# Patient Record
Sex: FEMALE | Race: WHITE | NOT HISPANIC OR LATINO
[De-identification: names, ages, dates, MRNs, and addresses within clinical notes are randomized per-mention and may not be internally consistent; named-entity substitution may affect disease eponyms.]

---

## 2019-03-22 ENCOUNTER — APPOINTMENT (OUTPATIENT)
Dept: OPHTHALMOLOGY | Facility: CLINIC | Age: 39
End: 2019-03-22

## 2019-03-22 PROBLEM — Z00.00 ENCOUNTER FOR PREVENTIVE HEALTH EXAMINATION: Status: ACTIVE | Noted: 2019-03-22

## 2019-04-02 ENCOUNTER — APPOINTMENT (OUTPATIENT)
Dept: OPHTHALMOLOGY | Facility: CLINIC | Age: 39
End: 2019-04-02
Payer: COMMERCIAL

## 2019-04-02 DIAGNOSIS — H04.123 DRY EYE SYNDROME OF BILATERAL LACRIMAL GLANDS: ICD-10-CM

## 2019-04-02 PROCEDURE — 92002 INTRM OPH EXAM NEW PATIENT: CPT

## 2021-04-08 ENCOUNTER — RESULT REVIEW (OUTPATIENT)
Age: 41
End: 2021-04-08

## 2021-05-12 ENCOUNTER — APPOINTMENT (OUTPATIENT)
Dept: HUMAN REPRODUCTION | Facility: CLINIC | Age: 41
End: 2021-05-12
Payer: COMMERCIAL

## 2021-05-12 PROCEDURE — 99204 OFFICE O/P NEW MOD 45 MIN: CPT | Mod: 95

## 2021-05-18 ENCOUNTER — TRANSCRIPTION ENCOUNTER (OUTPATIENT)
Age: 41
End: 2021-05-18

## 2021-06-21 ENCOUNTER — APPOINTMENT (OUTPATIENT)
Dept: HUMAN REPRODUCTION | Facility: CLINIC | Age: 41
End: 2021-06-21
Payer: COMMERCIAL

## 2021-06-21 PROCEDURE — 76831 ECHO EXAM UTERUS: CPT

## 2021-06-21 PROCEDURE — 99214 OFFICE O/P EST MOD 30 MIN: CPT | Mod: 25

## 2021-06-21 PROCEDURE — 99072 ADDL SUPL MATRL&STAF TM PHE: CPT

## 2021-06-21 PROCEDURE — 58340 CATHETER FOR HYSTEROGRAPHY: CPT

## 2021-09-07 ENCOUNTER — APPOINTMENT (OUTPATIENT)
Dept: HUMAN REPRODUCTION | Facility: CLINIC | Age: 41
End: 2021-09-07
Payer: SELF-PAY

## 2021-09-07 PROCEDURE — 99213 OFFICE O/P EST LOW 20 MIN: CPT | Mod: 25

## 2021-09-07 PROCEDURE — 36415 COLL VENOUS BLD VENIPUNCTURE: CPT

## 2021-09-07 PROCEDURE — 76830 TRANSVAGINAL US NON-OB: CPT

## 2021-09-07 PROCEDURE — 99072 ADDL SUPL MATRL&STAF TM PHE: CPT

## 2021-09-08 ENCOUNTER — APPOINTMENT (OUTPATIENT)
Dept: HUMAN REPRODUCTION | Facility: CLINIC | Age: 41
End: 2021-09-08
Payer: SELF-PAY

## 2021-09-08 PROCEDURE — 58322 ARTIFICIAL INSEMINATION: CPT

## 2021-09-08 PROCEDURE — 89260 SPERM ISOLATION SIMPLE: CPT

## 2021-09-22 ENCOUNTER — APPOINTMENT (OUTPATIENT)
Dept: HUMAN REPRODUCTION | Facility: CLINIC | Age: 41
End: 2021-09-22

## 2021-11-01 ENCOUNTER — APPOINTMENT (OUTPATIENT)
Dept: HUMAN REPRODUCTION | Facility: CLINIC | Age: 41
End: 2021-11-01
Payer: SELF-PAY

## 2021-11-01 PROCEDURE — 99213 OFFICE O/P EST LOW 20 MIN: CPT | Mod: 25

## 2021-11-01 PROCEDURE — 76830 TRANSVAGINAL US NON-OB: CPT

## 2021-11-01 PROCEDURE — 36415 COLL VENOUS BLD VENIPUNCTURE: CPT

## 2021-11-01 PROCEDURE — 82670 ASSAY OF TOTAL ESTRADIOL: CPT

## 2021-11-01 PROCEDURE — 83002 ASSAY OF GONADOTROPIN (LH): CPT | Mod: QW

## 2021-11-02 ENCOUNTER — APPOINTMENT (OUTPATIENT)
Dept: HUMAN REPRODUCTION | Facility: CLINIC | Age: 41
End: 2021-11-02
Payer: SELF-PAY

## 2021-11-02 PROCEDURE — 89260 SPERM ISOLATION SIMPLE: CPT

## 2021-11-02 PROCEDURE — 58322 ARTIFICIAL INSEMINATION: CPT

## 2021-11-02 PROCEDURE — 99212 OFFICE O/P EST SF 10 MIN: CPT | Mod: 25

## 2024-09-13 ENCOUNTER — OUTPATIENT (OUTPATIENT)
Dept: OUTPATIENT SERVICES | Facility: HOSPITAL | Age: 44
LOS: 1 days | End: 2024-09-13
Payer: SELF-PAY

## 2024-09-13 VITALS
OXYGEN SATURATION: 100 % | RESPIRATION RATE: 18 BRPM | HEART RATE: 73 BPM | TEMPERATURE: 98 F | DIASTOLIC BLOOD PRESSURE: 84 MMHG | SYSTOLIC BLOOD PRESSURE: 130 MMHG

## 2024-09-13 DIAGNOSIS — Z87.42 PERSONAL HISTORY OF OTHER DISEASES OF THE FEMALE GENITAL TRACT: Chronic | ICD-10-CM

## 2024-09-13 DIAGNOSIS — O26.899 OTHER SPECIFIED PREGNANCY RELATED CONDITIONS, UNSPECIFIED TRIMESTER: ICD-10-CM

## 2024-09-13 DIAGNOSIS — Z98.890 OTHER SPECIFIED POSTPROCEDURAL STATES: Chronic | ICD-10-CM

## 2024-09-13 LAB
ALBUMIN SERPL ELPH-MCNC: 3.2 G/DL — LOW (ref 3.3–5)
ALP SERPL-CCNC: 116 U/L — SIGNIFICANT CHANGE UP (ref 40–120)
ALT FLD-CCNC: 13 U/L — SIGNIFICANT CHANGE UP (ref 10–45)
ANION GAP SERPL CALC-SCNC: 14 MMOL/L — SIGNIFICANT CHANGE UP (ref 5–17)
APPEARANCE UR: CLEAR — SIGNIFICANT CHANGE UP
AST SERPL-CCNC: 17 U/L — SIGNIFICANT CHANGE UP (ref 10–40)
BASOPHILS # BLD AUTO: 0.01 K/UL — SIGNIFICANT CHANGE UP (ref 0–0.2)
BASOPHILS NFR BLD AUTO: 0.2 % — SIGNIFICANT CHANGE UP (ref 0–2)
BILIRUB SERPL-MCNC: 0.2 MG/DL — SIGNIFICANT CHANGE UP (ref 0.2–1.2)
BILIRUB UR-MCNC: NEGATIVE — SIGNIFICANT CHANGE UP
BUN SERPL-MCNC: 7 MG/DL — SIGNIFICANT CHANGE UP (ref 7–23)
CALCIUM SERPL-MCNC: 9.2 MG/DL — SIGNIFICANT CHANGE UP (ref 8.4–10.5)
CHLORIDE SERPL-SCNC: 104 MMOL/L — SIGNIFICANT CHANGE UP (ref 96–108)
CO2 SERPL-SCNC: 19 MMOL/L — LOW (ref 22–31)
COLOR SPEC: YELLOW — SIGNIFICANT CHANGE UP
CREAT ?TM UR-MCNC: 18 MG/DL — SIGNIFICANT CHANGE UP
CREAT SERPL-MCNC: 0.47 MG/DL — LOW (ref 0.5–1.3)
DIFF PNL FLD: NEGATIVE — SIGNIFICANT CHANGE UP
EGFR: 123 ML/MIN/1.73M2 — SIGNIFICANT CHANGE UP
EOSINOPHIL # BLD AUTO: 0.11 K/UL — SIGNIFICANT CHANGE UP (ref 0–0.5)
EOSINOPHIL NFR BLD AUTO: 1.7 % — SIGNIFICANT CHANGE UP (ref 0–6)
GLUCOSE SERPL-MCNC: 75 MG/DL — SIGNIFICANT CHANGE UP (ref 70–99)
GLUCOSE UR QL: NEGATIVE MG/DL — SIGNIFICANT CHANGE UP
HCT VFR BLD CALC: 32.4 % — LOW (ref 34.5–45)
HGB BLD-MCNC: 11.1 G/DL — LOW (ref 11.5–15.5)
IMM GRANULOCYTES NFR BLD AUTO: 0.3 % — SIGNIFICANT CHANGE UP (ref 0–0.9)
KETONES UR-MCNC: NEGATIVE MG/DL — SIGNIFICANT CHANGE UP
LDH SERPL L TO P-CCNC: 268 U/L — HIGH (ref 50–242)
LEUKOCYTE ESTERASE UR-ACNC: NEGATIVE — SIGNIFICANT CHANGE UP
LYMPHOCYTES # BLD AUTO: 1.08 K/UL — SIGNIFICANT CHANGE UP (ref 1–3.3)
LYMPHOCYTES # BLD AUTO: 16.5 % — SIGNIFICANT CHANGE UP (ref 13–44)
MCHC RBC-ENTMCNC: 31.4 PG — SIGNIFICANT CHANGE UP (ref 27–34)
MCHC RBC-ENTMCNC: 34.3 GM/DL — SIGNIFICANT CHANGE UP (ref 32–36)
MCV RBC AUTO: 91.5 FL — SIGNIFICANT CHANGE UP (ref 80–100)
MONOCYTES # BLD AUTO: 0.4 K/UL — SIGNIFICANT CHANGE UP (ref 0–0.9)
MONOCYTES NFR BLD AUTO: 6.1 % — SIGNIFICANT CHANGE UP (ref 2–14)
NEUTROPHILS # BLD AUTO: 4.92 K/UL — SIGNIFICANT CHANGE UP (ref 1.8–7.4)
NEUTROPHILS NFR BLD AUTO: 75.2 % — SIGNIFICANT CHANGE UP (ref 43–77)
NITRITE UR-MCNC: NEGATIVE — SIGNIFICANT CHANGE UP
NRBC # BLD: 0 /100 WBCS — SIGNIFICANT CHANGE UP (ref 0–0)
PH UR: 7 — SIGNIFICANT CHANGE UP (ref 5–8)
PLATELET # BLD AUTO: 230 K/UL — SIGNIFICANT CHANGE UP (ref 150–400)
POTASSIUM SERPL-MCNC: 4.7 MMOL/L — SIGNIFICANT CHANGE UP (ref 3.5–5.3)
POTASSIUM SERPL-SCNC: 4.7 MMOL/L — SIGNIFICANT CHANGE UP (ref 3.5–5.3)
PROT ?TM UR-MCNC: 8 MG/DL — SIGNIFICANT CHANGE UP (ref 0–12)
PROT SERPL-MCNC: 6.1 G/DL — SIGNIFICANT CHANGE UP (ref 6–8.3)
PROT UR-MCNC: NEGATIVE MG/DL — SIGNIFICANT CHANGE UP
PROT/CREAT UR-RTO: 0.4 RATIO — HIGH (ref 0–0.2)
RBC # BLD: 3.54 M/UL — LOW (ref 3.8–5.2)
RBC # FLD: 12.8 % — SIGNIFICANT CHANGE UP (ref 10.3–14.5)
SODIUM SERPL-SCNC: 137 MMOL/L — SIGNIFICANT CHANGE UP (ref 135–145)
SP GR SPEC: <1.005 — LOW (ref 1–1.03)
URATE SERPL-MCNC: 4.9 MG/DL — SIGNIFICANT CHANGE UP (ref 2.5–7)
UROBILINOGEN FLD QL: 0.2 MG/DL — SIGNIFICANT CHANGE UP (ref 0.2–1)
WBC # BLD: 6.54 K/UL — SIGNIFICANT CHANGE UP (ref 3.8–10.5)
WBC # FLD AUTO: 6.54 K/UL — SIGNIFICANT CHANGE UP (ref 3.8–10.5)

## 2024-09-13 PROCEDURE — 84156 ASSAY OF PROTEIN URINE: CPT

## 2024-09-13 PROCEDURE — G0463: CPT

## 2024-09-13 PROCEDURE — 59025 FETAL NON-STRESS TEST: CPT

## 2024-09-13 PROCEDURE — 84550 ASSAY OF BLOOD/URIC ACID: CPT

## 2024-09-13 PROCEDURE — 82570 ASSAY OF URINE CREATININE: CPT

## 2024-09-13 PROCEDURE — 36415 COLL VENOUS BLD VENIPUNCTURE: CPT

## 2024-09-13 PROCEDURE — 81003 URINALYSIS AUTO W/O SCOPE: CPT

## 2024-09-13 PROCEDURE — 85025 COMPLETE CBC W/AUTO DIFF WBC: CPT

## 2024-09-13 PROCEDURE — 83615 LACTATE (LD) (LDH) ENZYME: CPT

## 2024-09-13 PROCEDURE — 80053 COMPREHEN METABOLIC PANEL: CPT

## 2024-09-13 NOTE — OB PROVIDER TRIAGE NOTE - HISTORY OF PRESENT ILLNESS
HPI: Pt is a 44yo  presenting for r/o PEC.  FM: endorsed  LOF: denies  CTX: denies  VB: denies    PNC uncomplicated.    OBHx: primigravida  GynHx: endometriosis; denies hx of fibroids, cysts, abnormal Pap smears, polyps, or STDs  PMHx: denies  PSHx: s/p laparoscopy for endo ; s/p hernia repair  Med: PNV, bASA  All: NKDA  Psych: denies hx of depression or anxiety  SH: denies hx of smoking, drinking, or drug usage during the pregnancy

## 2024-09-13 NOTE — OB RN TRIAGE NOTE - FALL HARM RISK - FALLEN IN PAST
Level of Care: Telemetry [5]   Diagnosis: Diarrhea [787.91.ICD-9-CM]   Admitting Physician: SCOOTER QUIÑONEZ III [[id number]]   Attending Physician: SCOOTER QUIÑONEZ III [[id number]]   Bed Request Comments: tele obs   No

## 2024-09-13 NOTE — OB PROVIDER TRIAGE NOTE - NSOBPROVIDERNOTE_OBGYN_ALL_OB_FT
INCOMPLETE    A/P: Pt is a 43y  who presents for r/o PEC.     1. Admit to LND. Routine Labs. IVF  2. Expectant Management vs. IOL  3. Fetus: Cat X tracing, Vertex, EFW _ . C/w EFM.  4+ List all prenatal issues w/ a plan (PEC, GDM, TOLAC, Asthma, etc)  5. GBS status + plan  6. Pain: IV pain meds/epidural PRN    John Hart PGY1  Discussed with Dr. Joseph A/P: Pt is a 43y  who presents for r/o PEC. Pt had elevated BPs in the office.     - cycle BPs q15 min   - f/u HELLP labs and P/C  - no severe symptoms at this time  - fetal status reassuring     John Hart PGY1  Heather Holman, PGY4  d/w Dr. Joseph   Discussed with Dr. Joseph A/P: Pt is a 43y  who presents for r/o PEC. Pt had elevated BPs in the office.     - cycle BPs q15 min   - f/u HELLP labs and P/C  - no severe symptoms at this time  - fetal status reassuring     John Hart PGY1  Heather Holman, PGY4  d/w Dr. Joseph   Discussed with Dr. Joseph    Addendum:  HELLP labs wnl, P/C 0.4  Pt meets criteria for PEC w/o severe features  Counseled on PEC, concerning symptoms including HA/vision changes/SOB/abdominal pain   BID BP check @home  24h urine collection, urine jug given to pt to complete collection @home  Discharge to home   Return precautions reviewed  Fetal status reassuring on NST    Dr. Joseph at bedside  John Hart PGY1

## 2024-09-13 NOTE — OB RN TRIAGE NOTE - NSNURSINGINSTR_OBGYN_ALL_OB_FT
patient given written and verbal PTL discharge instructions; patient educated on 24hr urine collection and given jug to collect at home; patient made aware to follow-up with MD office; patient  verbalized understanding of all instructions

## 2024-09-13 NOTE — OB PROVIDER TRIAGE NOTE - NSHPPHYSICALEXAM_GEN_ALL_CORE
Vital Signs Last 24 Hrs  T(C): 36.8 (13 Sep 2024 10:27), Max: 36.8 (13 Sep 2024 10:14)  T(F): 98.24 (13 Sep 2024 10:27), Max: 98.24 (13 Sep 2024 10:27)  HR: 77 (13 Sep 2024 10:58) (73 - 86)  BP: 147/97 (13 Sep 2024 10:57) (130/84 - 147/97)  BP(mean): --  RR: 18 (13 Sep 2024 10:27) (18 - 18)  SpO2: 100% (13 Sep 2024 10:58) (99% - 100%)    Parameters below as of 13 Sep 2024 10:14  Patient On (Oxygen Delivery Method): room air        Physical Exam:  Gen: NAD, AOx3  CV: RRR  Resp: CTAB  Abd: soft, NT, gravid    Speculum Exam:   - pooling, nitrazine, ferning, bleeding   - (lesions if pt has history of HSV)    SVE:   FHT:  [place]:  EFW: Sono/leopolds  Sono: fetal presentation, placentation  BPP: Vital Signs Last 24 Hrs  T(C): 36.8 (13 Sep 2024 10:27), Max: 36.8 (13 Sep 2024 10:14)  T(F): 98.24 (13 Sep 2024 10:27), Max: 98.24 (13 Sep 2024 10:27)  HR: 77 (13 Sep 2024 10:58) (73 - 86)  BP: 147/97 (13 Sep 2024 10:57) (130/84 - 147/97)  BP(mean): --  RR: 18 (13 Sep 2024 10:27) (18 - 18)  SpO2: 100% (13 Sep 2024 10:58) (99% - 100%)    Parameters below as of 13 Sep 2024 10:14  Patient On (Oxygen Delivery Method): room air        Physical Exam:  Gen: NAD, AOx3  CV: extremies  Resp: no increased WOB  Abd: gravid    Speculum Exam deferred    SVE deferred  FHT: baseline 125, mod nikunj, +accels, - decels  [place]: uterine irritability

## 2024-09-15 ENCOUNTER — OUTPATIENT (OUTPATIENT)
Dept: OUTPATIENT SERVICES | Facility: HOSPITAL | Age: 44
LOS: 1 days | End: 2024-09-15
Payer: SELF-PAY

## 2024-09-15 VITALS
HEART RATE: 77 BPM | SYSTOLIC BLOOD PRESSURE: 141 MMHG | TEMPERATURE: 98 F | DIASTOLIC BLOOD PRESSURE: 86 MMHG | RESPIRATION RATE: 16 BRPM | OXYGEN SATURATION: 97 %

## 2024-09-15 DIAGNOSIS — Z87.42 PERSONAL HISTORY OF OTHER DISEASES OF THE FEMALE GENITAL TRACT: Chronic | ICD-10-CM

## 2024-09-15 DIAGNOSIS — Z98.890 OTHER SPECIFIED POSTPROCEDURAL STATES: Chronic | ICD-10-CM

## 2024-09-15 DIAGNOSIS — O26.899 OTHER SPECIFIED PREGNANCY RELATED CONDITIONS, UNSPECIFIED TRIMESTER: ICD-10-CM

## 2024-09-15 LAB
APPEARANCE UR: CLEAR — SIGNIFICANT CHANGE UP
BILIRUB UR-MCNC: NEGATIVE — SIGNIFICANT CHANGE UP
COLOR SPEC: YELLOW — SIGNIFICANT CHANGE UP
DIFF PNL FLD: NEGATIVE — SIGNIFICANT CHANGE UP
GLUCOSE UR QL: NEGATIVE MG/DL — SIGNIFICANT CHANGE UP
KETONES UR-MCNC: NEGATIVE MG/DL — SIGNIFICANT CHANGE UP
LEUKOCYTE ESTERASE UR-ACNC: ABNORMAL
NITRITE UR-MCNC: NEGATIVE — SIGNIFICANT CHANGE UP
PH UR: 7.5 — SIGNIFICANT CHANGE UP (ref 5–8)
PROT UR-MCNC: NEGATIVE MG/DL — SIGNIFICANT CHANGE UP
SP GR SPEC: 1.01 — SIGNIFICANT CHANGE UP (ref 1–1.03)
UROBILINOGEN FLD QL: 0.2 MG/DL — SIGNIFICANT CHANGE UP (ref 0.2–1)

## 2024-09-15 NOTE — OB PROVIDER TRIAGE NOTE - ATTENDING COMMENTS
Pt seen 845 am 9/16 after being monitored o/n.  BP's have been stable, no severe range BPs, labs unchanged.  Rec course of BMZ for FLM as poss need for PTD sec to gHTN if becomes severe, pros/cons reviewed.  Pt agreed.  Will then dc home, cont to monitor BPs, f/u in office tomorrow for 2nd dose.  DOUGLAS Joseph

## 2024-09-15 NOTE — OB RN TRIAGE NOTE - FALL HARM RISK - UNIVERSAL INTERVENTIONS
Bed in lowest position, wheels locked, appropriate side rails in place/Call bell, personal items and telephone in reach/Instruct patient to call for assistance before getting out of bed or chair/Non-slip footwear when patient is out of bed/[place] to call system/Physically safe environment - no spills, clutter or unnecessary equipment/Purposeful Proactive Rounding/Room/bathroom lighting operational, light cord in reach

## 2024-09-15 NOTE — OB PROVIDER TRIAGE NOTE - NSOBPROVIDERNOTE_OBGYN_ALL_OB_FT
A/P: Pt is a 43y  @ 33w3d who presents with elevated BPs at home. Pt with known PEC.    - cycle BPs q15 min   - f/u HELLP labs   - no severe symptoms at this time  - fetal status reassuring     Molly Castro PGY2 [Fully active, able to carry on all pre-disease performance without restriction] : Status 0 - Fully active, able to carry on all pre-disease performance without restriction [Normal] : affect appropriate

## 2024-09-15 NOTE — OB PROVIDER TRIAGE NOTE - HISTORY OF PRESENT ILLNESS
HPI: Pt is a 42yo  @ 33w3d presents with elevated BPs at home. Pt with known PEC. Pt reports elevated BPs 150-160s systolic at home.   Denies HA, vision changes, RUQ pain, chest pain, SOB, N/V.    +FM. -LOF,-CTX,-VB.     PNC uncomplicated.    OBHx: primigravida  GynHx: endometriosis; denies hx of fibroids, cysts, abnormal Pap smears, polyps, or STDs  PMHx: denies  PSHx: s/p laparoscopy for endo ; s/p hernia repair  Med: PNV, bASA  All: NKDA  Psych: denies hx of depression or anxiety  SH: denies hx of smoking, drinking, or drug usage during the pregnancy       HPI: Pt is a 44yo  @ 33w3d presents with elevated BPs at home. Pt with known PEC. Pt reports elevated BPs 150-160s systolic at home.   Denies HA, vision changes, RUQ pain, chest pain, SOB, N/V.    +FM. -LOF,-CTX,-VB.     PNC: diagnosed with PEC w/o SF on     OBHx: primigravida  GynHx: endometriosis; denies hx of fibroids, cysts, abnormal Pap smears, polyps, or STDs  PMHx: denies  PSHx: s/p laparoscopy for endo ; s/p hernia repair  Med: PNV, bASA  All: NKDA  Psych: denies hx of depression or anxiety  SH: denies hx of smoking, drinking, or drug usage during the pregnancy

## 2024-09-15 NOTE — OB PROVIDER TRIAGE NOTE - NSRISKFACTORSDETA_OBGYN_ALL_OB
----- Message from Merlene Feliciano MD sent at 7/6/2022 10:09 PM CDT -----  Bruna/Cardiology nurse - please notify patient of stable Holter, but recommend f/u Holter in 1 year (48hour) to re-assess burden of ectopy - please do 1 week prior to Cardiology f/u visit.  Thanks - Merlene Feliciano
Holter monitor 6/29/22:  1. Â Predominant rhythm is sinus rhythm and sinus bradycardia. 2. Â Rare supraventricular ectopy with total burden 0.36% without sustained symptomatic atrial arrhythmia or supraventricular arrhythmia or definite evidence of atrial fibrillation. 3. Â Occasional premature ventricular contractions with intermediate burden overall of 9.49%. Â PVCs do not appear to correlate with any particular symptoms. 4. Â During report of symptoms, as described above, the patient did not appear to have evidence of any arrhythmia.
Left detailed message, per snapshot, with results and recommendations. Monitor order placed and callback number provided for patient to call and schedule.
Gestational Hypertension

## 2024-09-15 NOTE — OB PROVIDER TRIAGE NOTE - NSHPPHYSICALEXAM_GEN_ALL_CORE
Vitals:   AF  BP:  HR:    Physical Exam:  Gen: NAD, AOx3  CV: extremities  Resp: no increased WOB  Abd: gravid    Speculum Exam deferred    SVE deferred  FHT: baseline _, mod nikunj, +accels, - decels  [place]: uterine irritability Vitals:   AF  BP: 141/86  HR: 77    Physical Exam:  Gen: NAD, AOx3  CV: extremities  Resp: no increased WOB  Abd: gravid    Speculum Exam deferred    SVE deferred  FHT: baseline 135bpm, mod nikunj, +accels, - decels  [place]: uterine irritability

## 2024-09-16 VITALS — HEART RATE: 84 BPM | OXYGEN SATURATION: 94 %

## 2024-09-16 VITALS — HEART RATE: 79 BPM | OXYGEN SATURATION: 98 %

## 2024-09-16 DIAGNOSIS — N80.9 ENDOMETRIOSIS, UNSPECIFIED: ICD-10-CM

## 2024-09-16 DIAGNOSIS — Z3A.33 33 WEEKS GESTATION OF PREGNANCY: ICD-10-CM

## 2024-09-16 DIAGNOSIS — O09.813 SUPERVISION OF PREGNANCY RESULTING FROM ASSISTED REPRODUCTIVE TECHNOLOGY, THIRD TRIMESTER: ICD-10-CM

## 2024-09-16 DIAGNOSIS — O09.513 SUPERVISION OF ELDERLY PRIMIGRAVIDA, THIRD TRIMESTER: ICD-10-CM

## 2024-09-16 DIAGNOSIS — O99.891 OTHER SPECIFIED DISEASES AND CONDITIONS COMPLICATING PREGNANCY: ICD-10-CM

## 2024-09-16 DIAGNOSIS — O14.93 UNSPECIFIED PRE-ECLAMPSIA, THIRD TRIMESTER: ICD-10-CM

## 2024-09-16 LAB
ALBUMIN SERPL ELPH-MCNC: 3.2 G/DL — LOW (ref 3.3–5)
ALP SERPL-CCNC: 119 U/L — SIGNIFICANT CHANGE UP (ref 40–120)
ALT FLD-CCNC: 16 U/L — SIGNIFICANT CHANGE UP (ref 10–45)
ANION GAP SERPL CALC-SCNC: 12 MMOL/L — SIGNIFICANT CHANGE UP (ref 5–17)
AST SERPL-CCNC: 18 U/L — SIGNIFICANT CHANGE UP (ref 10–40)
BACTERIA # UR AUTO: NEGATIVE /HPF — SIGNIFICANT CHANGE UP
BASOPHILS # BLD AUTO: 0.02 K/UL — SIGNIFICANT CHANGE UP (ref 0–0.2)
BASOPHILS NFR BLD AUTO: 0.3 % — SIGNIFICANT CHANGE UP (ref 0–2)
BILIRUB SERPL-MCNC: 0.1 MG/DL — LOW (ref 0.2–1.2)
BUN SERPL-MCNC: 7 MG/DL — SIGNIFICANT CHANGE UP (ref 7–23)
CALCIUM SERPL-MCNC: 9.6 MG/DL — SIGNIFICANT CHANGE UP (ref 8.4–10.5)
CAST: 0 /LPF — SIGNIFICANT CHANGE UP (ref 0–4)
CHLORIDE SERPL-SCNC: 105 MMOL/L — SIGNIFICANT CHANGE UP (ref 96–108)
CO2 SERPL-SCNC: 20 MMOL/L — LOW (ref 22–31)
CREAT ?TM UR-MCNC: 32 MG/DL — SIGNIFICANT CHANGE UP
CREAT SERPL-MCNC: 0.42 MG/DL — LOW (ref 0.5–1.3)
EGFR: 126 ML/MIN/1.73M2 — SIGNIFICANT CHANGE UP
EOSINOPHIL # BLD AUTO: 0.16 K/UL — SIGNIFICANT CHANGE UP (ref 0–0.5)
EOSINOPHIL NFR BLD AUTO: 2.1 % — SIGNIFICANT CHANGE UP (ref 0–6)
GLUCOSE SERPL-MCNC: 91 MG/DL — SIGNIFICANT CHANGE UP (ref 70–99)
HCT VFR BLD CALC: 32.6 % — LOW (ref 34.5–45)
HGB BLD-MCNC: 11 G/DL — LOW (ref 11.5–15.5)
IMM GRANULOCYTES NFR BLD AUTO: 0.3 % — SIGNIFICANT CHANGE UP (ref 0–0.9)
LDH SERPL L TO P-CCNC: 215 U/L — SIGNIFICANT CHANGE UP (ref 50–242)
LYMPHOCYTES # BLD AUTO: 1.52 K/UL — SIGNIFICANT CHANGE UP (ref 1–3.3)
LYMPHOCYTES # BLD AUTO: 19.9 % — SIGNIFICANT CHANGE UP (ref 13–44)
MCHC RBC-ENTMCNC: 30.2 PG — SIGNIFICANT CHANGE UP (ref 27–34)
MCHC RBC-ENTMCNC: 33.7 GM/DL — SIGNIFICANT CHANGE UP (ref 32–36)
MCV RBC AUTO: 89.6 FL — SIGNIFICANT CHANGE UP (ref 80–100)
MONOCYTES # BLD AUTO: 0.47 K/UL — SIGNIFICANT CHANGE UP (ref 0–0.9)
MONOCYTES NFR BLD AUTO: 6.2 % — SIGNIFICANT CHANGE UP (ref 2–14)
NEUTROPHILS # BLD AUTO: 5.44 K/UL — SIGNIFICANT CHANGE UP (ref 1.8–7.4)
NEUTROPHILS NFR BLD AUTO: 71.2 % — SIGNIFICANT CHANGE UP (ref 43–77)
NRBC # BLD: 0 /100 WBCS — SIGNIFICANT CHANGE UP (ref 0–0)
PLATELET # BLD AUTO: 213 K/UL — SIGNIFICANT CHANGE UP (ref 150–400)
POTASSIUM SERPL-MCNC: 4.1 MMOL/L — SIGNIFICANT CHANGE UP (ref 3.5–5.3)
POTASSIUM SERPL-SCNC: 4.1 MMOL/L — SIGNIFICANT CHANGE UP (ref 3.5–5.3)
PROT ?TM UR-MCNC: 13 MG/DL — HIGH (ref 0–12)
PROT SERPL-MCNC: 6 G/DL — SIGNIFICANT CHANGE UP (ref 6–8.3)
PROT/CREAT UR-RTO: 0.4 RATIO — HIGH (ref 0–0.2)
RBC # BLD: 3.64 M/UL — LOW (ref 3.8–5.2)
RBC # FLD: 12.4 % — SIGNIFICANT CHANGE UP (ref 10.3–14.5)
RBC CASTS # UR COMP ASSIST: 0 /HPF — SIGNIFICANT CHANGE UP (ref 0–4)
SODIUM SERPL-SCNC: 137 MMOL/L — SIGNIFICANT CHANGE UP (ref 135–145)
SQUAMOUS # UR AUTO: 3 /HPF — SIGNIFICANT CHANGE UP (ref 0–5)
URATE SERPL-MCNC: 4.2 MG/DL — SIGNIFICANT CHANGE UP (ref 2.5–7)
WBC # BLD: 7.63 K/UL — SIGNIFICANT CHANGE UP (ref 3.8–10.5)
WBC # FLD AUTO: 7.63 K/UL — SIGNIFICANT CHANGE UP (ref 3.8–10.5)
WBC UR QL: 1 /HPF — SIGNIFICANT CHANGE UP (ref 0–5)

## 2024-09-16 PROCEDURE — 85025 COMPLETE CBC W/AUTO DIFF WBC: CPT

## 2024-09-16 PROCEDURE — 96372 THER/PROPH/DIAG INJ SC/IM: CPT

## 2024-09-16 PROCEDURE — 81001 URINALYSIS AUTO W/SCOPE: CPT

## 2024-09-16 PROCEDURE — 82570 ASSAY OF URINE CREATININE: CPT

## 2024-09-16 PROCEDURE — 84156 ASSAY OF PROTEIN URINE: CPT

## 2024-09-16 PROCEDURE — 84550 ASSAY OF BLOOD/URIC ACID: CPT

## 2024-09-16 PROCEDURE — 80053 COMPREHEN METABOLIC PANEL: CPT

## 2024-09-16 PROCEDURE — G0463: CPT

## 2024-09-16 PROCEDURE — 83615 LACTATE (LD) (LDH) ENZYME: CPT

## 2024-09-16 RX ORDER — SODIUM CITRATE AND CITRIC ACID MONOHYDRATE 334; 500 MG/5ML; MG/5ML
15 SOLUTION ORAL ONCE
Refills: 0 | Status: COMPLETED | OUTPATIENT
Start: 2024-09-16 | End: 2024-09-16

## 2024-09-16 RX ORDER — BETAMETHASONE VALERATE
12 POWDER (GRAM) MISCELLANEOUS ONCE
Refills: 0 | Status: COMPLETED | OUTPATIENT
Start: 2024-09-16 | End: 2024-09-16

## 2024-09-16 RX ADMIN — Medication 12 MILLIGRAM(S): at 09:21

## 2024-09-16 RX ADMIN — SODIUM CITRATE AND CITRIC ACID MONOHYDRATE 15 MILLILITER(S): 334; 500 SOLUTION ORAL at 00:39

## 2024-09-17 DIAGNOSIS — Z3A.33 33 WEEKS GESTATION OF PREGNANCY: ICD-10-CM

## 2024-09-17 DIAGNOSIS — O09.513 SUPERVISION OF ELDERLY PRIMIGRAVIDA, THIRD TRIMESTER: ICD-10-CM

## 2024-09-17 DIAGNOSIS — O13.3 GESTATIONAL [PREGNANCY-INDUCED] HYPERTENSION WITHOUT SIGNIFICANT PROTEINURIA, THIRD TRIMESTER: ICD-10-CM

## 2024-09-17 DIAGNOSIS — O14.93 UNSPECIFIED PRE-ECLAMPSIA, THIRD TRIMESTER: ICD-10-CM

## 2024-09-17 LAB
COLLECT DURATION TIME UR: 24 HR — SIGNIFICANT CHANGE UP
PROT 24H UR-MRATE: 532 MG/24 H — HIGH (ref 50–100)
TOTAL VOLUME - 24 HOUR: 1900 ML — SIGNIFICANT CHANGE UP
URINE CREATININE CALCULATION: 1 G/24 H — SIGNIFICANT CHANGE UP (ref 0.8–1.8)

## 2024-09-18 ENCOUNTER — INPATIENT (INPATIENT)
Facility: HOSPITAL | Age: 44
LOS: 5 days | Discharge: HOME | DRG: 951 | End: 2024-09-24
Attending: OBSTETRICS & GYNECOLOGY | Admitting: OBSTETRICS & GYNECOLOGY
Payer: COMMERCIAL

## 2024-09-18 VITALS — HEART RATE: 96 BPM | OXYGEN SATURATION: 96 %

## 2024-09-18 DIAGNOSIS — O26.899 OTHER SPECIFIED PREGNANCY RELATED CONDITIONS, UNSPECIFIED TRIMESTER: ICD-10-CM

## 2024-09-18 DIAGNOSIS — Z34.80 ENCOUNTER FOR SUPERVISION OF OTHER NORMAL PREGNANCY, UNSPECIFIED TRIMESTER: ICD-10-CM

## 2024-09-18 DIAGNOSIS — Z98.890 OTHER SPECIFIED POSTPROCEDURAL STATES: Chronic | ICD-10-CM

## 2024-09-18 DIAGNOSIS — Z87.42 PERSONAL HISTORY OF OTHER DISEASES OF THE FEMALE GENITAL TRACT: Chronic | ICD-10-CM

## 2024-09-18 LAB
ALBUMIN SERPL ELPH-MCNC: 3.3 G/DL — SIGNIFICANT CHANGE UP (ref 3.3–5)
ALP SERPL-CCNC: 121 U/L — HIGH (ref 40–120)
ALT FLD-CCNC: 27 U/L — SIGNIFICANT CHANGE UP (ref 10–45)
ANION GAP SERPL CALC-SCNC: 13 MMOL/L — SIGNIFICANT CHANGE UP (ref 5–17)
APPEARANCE UR: ABNORMAL
APTT BLD: 24.2 SEC — LOW (ref 24.5–35.6)
AST SERPL-CCNC: 21 U/L — SIGNIFICANT CHANGE UP (ref 10–40)
BACTERIA # UR AUTO: ABNORMAL /HPF
BASOPHILS # BLD AUTO: 0.03 K/UL — SIGNIFICANT CHANGE UP (ref 0–0.2)
BASOPHILS NFR BLD AUTO: 0.3 % — SIGNIFICANT CHANGE UP (ref 0–2)
BILIRUB SERPL-MCNC: 0.1 MG/DL — LOW (ref 0.2–1.2)
BILIRUB UR-MCNC: NEGATIVE — SIGNIFICANT CHANGE UP
BLD GP AB SCN SERPL QL: NEGATIVE — SIGNIFICANT CHANGE UP
BUN SERPL-MCNC: 9 MG/DL — SIGNIFICANT CHANGE UP (ref 7–23)
CALCIUM SERPL-MCNC: 8.9 MG/DL — SIGNIFICANT CHANGE UP (ref 8.4–10.5)
CAST: 4 /LPF — SIGNIFICANT CHANGE UP (ref 0–4)
CHLORIDE SERPL-SCNC: 104 MMOL/L — SIGNIFICANT CHANGE UP (ref 96–108)
CO2 SERPL-SCNC: 20 MMOL/L — LOW (ref 22–31)
COLOR SPEC: YELLOW — SIGNIFICANT CHANGE UP
CREAT ?TM UR-MCNC: 119 MG/DL — SIGNIFICANT CHANGE UP
CREAT SERPL-MCNC: 0.44 MG/DL — LOW (ref 0.5–1.3)
DIFF PNL FLD: NEGATIVE — SIGNIFICANT CHANGE UP
EGFR: 125 ML/MIN/1.73M2 — SIGNIFICANT CHANGE UP
EOSINOPHIL # BLD AUTO: 0.07 K/UL — SIGNIFICANT CHANGE UP (ref 0–0.5)
EOSINOPHIL NFR BLD AUTO: 0.8 % — SIGNIFICANT CHANGE UP (ref 0–6)
FIBRINOGEN PPP-MCNC: 259 MG/DL — SIGNIFICANT CHANGE UP (ref 200–445)
GLUCOSE SERPL-MCNC: 96 MG/DL — SIGNIFICANT CHANGE UP (ref 70–99)
GLUCOSE UR QL: NEGATIVE MG/DL — SIGNIFICANT CHANGE UP
HCT VFR BLD CALC: 32.1 % — LOW (ref 34.5–45)
HGB BLD-MCNC: 10.9 G/DL — LOW (ref 11.5–15.5)
IMM GRANULOCYTES NFR BLD AUTO: 1 % — HIGH (ref 0–0.9)
KETONES UR-MCNC: NEGATIVE MG/DL — SIGNIFICANT CHANGE UP
LDH SERPL L TO P-CCNC: 208 U/L — SIGNIFICANT CHANGE UP (ref 50–242)
LEUKOCYTE ESTERASE UR-ACNC: ABNORMAL
LYMPHOCYTES # BLD AUTO: 1.4 K/UL — SIGNIFICANT CHANGE UP (ref 1–3.3)
LYMPHOCYTES # BLD AUTO: 16.3 % — SIGNIFICANT CHANGE UP (ref 13–44)
MCHC RBC-ENTMCNC: 31.8 PG — SIGNIFICANT CHANGE UP (ref 27–34)
MCHC RBC-ENTMCNC: 34 GM/DL — SIGNIFICANT CHANGE UP (ref 32–36)
MCV RBC AUTO: 93.6 FL — SIGNIFICANT CHANGE UP (ref 80–100)
MONOCYTES # BLD AUTO: 0.65 K/UL — SIGNIFICANT CHANGE UP (ref 0–0.9)
MONOCYTES NFR BLD AUTO: 7.6 % — SIGNIFICANT CHANGE UP (ref 2–14)
NEUTROPHILS # BLD AUTO: 6.34 K/UL — SIGNIFICANT CHANGE UP (ref 1.8–7.4)
NEUTROPHILS NFR BLD AUTO: 74 % — SIGNIFICANT CHANGE UP (ref 43–77)
NITRITE UR-MCNC: NEGATIVE — SIGNIFICANT CHANGE UP
NRBC # BLD: 0 /100 WBCS — SIGNIFICANT CHANGE UP (ref 0–0)
PH UR: 7.5 — SIGNIFICANT CHANGE UP (ref 5–8)
PLATELET # BLD AUTO: 207 K/UL — SIGNIFICANT CHANGE UP (ref 150–400)
POTASSIUM SERPL-MCNC: 4.2 MMOL/L — SIGNIFICANT CHANGE UP (ref 3.5–5.3)
POTASSIUM SERPL-SCNC: 4.2 MMOL/L — SIGNIFICANT CHANGE UP (ref 3.5–5.3)
PROT ?TM UR-MCNC: 87 MG/DL — HIGH (ref 0–12)
PROT SERPL-MCNC: 6.1 G/DL — SIGNIFICANT CHANGE UP (ref 6–8.3)
PROT UR-MCNC: 100 MG/DL
PROT/CREAT UR-RTO: 0.7 RATIO — HIGH (ref 0–0.2)
RBC # BLD: 3.43 M/UL — LOW (ref 3.8–5.2)
RBC # FLD: 12.7 % — SIGNIFICANT CHANGE UP (ref 10.3–14.5)
RBC CASTS # UR COMP ASSIST: 3 /HPF — SIGNIFICANT CHANGE UP (ref 0–4)
REVIEW: SIGNIFICANT CHANGE UP
RH IG SCN BLD-IMP: POSITIVE — SIGNIFICANT CHANGE UP
RH IG SCN BLD-IMP: POSITIVE — SIGNIFICANT CHANGE UP
SODIUM SERPL-SCNC: 137 MMOL/L — SIGNIFICANT CHANGE UP (ref 135–145)
SP GR SPEC: 1.02 — SIGNIFICANT CHANGE UP (ref 1–1.03)
SQUAMOUS # UR AUTO: >36 /HPF — HIGH (ref 0–5)
URATE SERPL-MCNC: 4.6 MG/DL — SIGNIFICANT CHANGE UP (ref 2.5–7)
UROBILINOGEN FLD QL: 0.2 MG/DL — SIGNIFICANT CHANGE UP (ref 0.2–1)
WBC # BLD: 8.58 K/UL — SIGNIFICANT CHANGE UP (ref 3.8–10.5)
WBC # FLD AUTO: 8.58 K/UL — SIGNIFICANT CHANGE UP (ref 3.8–10.5)
WBC UR QL: 4 /HPF — SIGNIFICANT CHANGE UP (ref 0–5)

## 2024-09-18 RX ORDER — PRENATAL VIT,CAL 76/IRON/FOLIC 29 MG-1 MG
1 TABLET ORAL DAILY
Refills: 0 | Status: DISCONTINUED | OUTPATIENT
Start: 2024-09-18 | End: 2024-09-19

## 2024-09-18 RX ORDER — SODIUM CITRATE AND CITRIC ACID MONOHYDRATE 334; 500 MG/5ML; MG/5ML
15 SOLUTION ORAL ONCE
Refills: 0 | Status: COMPLETED | OUTPATIENT
Start: 2024-09-18 | End: 2024-09-18

## 2024-09-18 RX ORDER — INFLUENZA VIRUS VACCINE 15; 15; 15; 15 UG/.5ML; UG/.5ML; UG/.5ML; UG/.5ML
0.5 SUSPENSION INTRAMUSCULAR ONCE
Refills: 0 | Status: COMPLETED | OUTPATIENT
Start: 2024-09-18 | End: 2024-09-24

## 2024-09-18 RX ADMIN — SODIUM CITRATE AND CITRIC ACID MONOHYDRATE 15 MILLILITER(S): 334; 500 SOLUTION ORAL at 22:31

## 2024-09-18 NOTE — OB PROVIDER H&P - GRAVIDA, OB PROFILE
Discussed with patient's poor prognosis with family. Per family, they are aware patient poor prognosis. Family has decided to palliative extubate patient tomorrow morning.
1

## 2024-09-18 NOTE — OB PROVIDER H&P - NSHPPHYSICALEXAM_GEN_ALL_CORE
Objective  – PE:   CV: RRR  Pulm: breathing comfortably on RA  Abd: gravid, nontender  Extr: moving all extremities with ease  – FHT: Reactive  – Sono: vertex

## 2024-09-18 NOTE — OB RN PATIENT PROFILE - NSSDOHLAW_OBGYN_A_OB
Patient attended Phase 2 Cardiac Rehab Exercise Session. Further documentation will be completed in Cardiac Science/[employer] System and will be scanned into the medical record upon discharge.   I do not need any legal help

## 2024-09-18 NOTE — OB RN TRIAGE NOTE - NS_TRIAGEPROVIDERNOTIFIED_OBGYN_ALL_OB_FT
There are no preventive care reminders to display for this patient.    Patient is up to date, no discussion needed.   MD Santiago

## 2024-09-18 NOTE — OB PROVIDER H&P - ASSESSMENT
Assessment  – Pt is a 42yo  @ 33w6d presents with elevated BPs at home and in the office today. Pt with known PEC.   Denies HA, vision changes, RUQ pain, chest pain, SOB, N/V. Patient admitted to antepartum for blood pressure monitoring in the setting of having elevated blood pressures that near severe range.     #PEC  - will monitor BP in triage and on ante, if severe range BP then will consider delivery.   - f/u HELLP labs    #Fetal wellbeing  - s/p BMZ in office  - NICU consult   - f/u GBS     #Maternal wellbeing  - Regular diet  - f/u Ucx ()  - HSQ/SCDs for DVT ppx  - PNV    Patient discussed with attending physician, Dr. You.    Prince Frank MD PGY3

## 2024-09-18 NOTE — OB RN TRIAGE NOTE - GRAVIDA, OB PROFILE
Patient : Bandar Washington Age: 49 year old Sex: male   MRN: [id number] Encounter Date: 9/11/2024      History     Chief Complaint   Patient presents with    Fall    Arm Pain     Patient presents status post fall.  He denies any head or neck trauma.  Patient has pain in his left proximal humerus.  Distal pulses are intact.  Patient possibly has some minor pain radiating to his clavicle.  Patient did drive to the ER.  He is agreeable to try a shot of Toradol for his pain.  He defers imaging of his head and neck.  He does have a mild abrasion over his left knee and defers imaging of this.        Allergies   Allergen Reactions    Penicillins SWELLING    Pseudoephedrine VOMITING    Tramadol VOMITING       Discharge Medication List as of 9/11/2024  8:40 PM        Prior to Admission Medications    Details   citalopram (CeleXA) 20 MG tablet Take 1.5 tab daily for 3 days then 1 tab daily for 3 days, then 1/2 tab daily for 3 days then discontinue.Historical Med      sertraline (ZOLOFT) 50 MG tablet Take 1/2 tab for 3 days then 1 tab daily for two weeks then 2 tabs daily.Eprescribe, Disp-30 tablet, R-1      buPROPion XL (WELLBUTRIN XL) 150 MG 24 hr tablet Take 1 tablet by mouth daily.Eprescribe, Disp-30 tablet, R-0      cloNIDine (CATAPRES) 0.1 MG tablet TAKE 1 TABLET BY MOUTH EVERYDAY AT BEDTIMEEprescribe, Disp-90 tablet, R-1      busPIRone (BUSPAR) 10 MG tablet Take 1.5 tablets by mouth in the morning and 1.5 tablets in the evening.Historical MedDose increased 7/3/2024      hydrOXYzine (ATARAX) 25 MG tablet Take 1 tablet by mouth every 8 hours as needed for Itching.Eprescribe, Disp-30 tablet, R-1      albuterol 108 (90 Base) MCG/ACT inhaler Inhale 2 puffs into the lungs every 4 hours as needed for Wheezing.Eprescribe, Disp-8.5 g, R-0      ipratropium-albuterol (DUONEB) 0.5-2.5 (3) MG/3ML nebulizer solution Take 3 mLs by nebulization every 6 hours as needed for Wheezing.Eprescribe, Nebulization, EVERY 6 HOURS PRN, Disp-90 mL,  R-1Each vial = 3 mL. Each box = 30 vials (90 mL).      Enter dispense quantity of 90 mL per box.     360 mL = Quantity Sufficient f or 30 days with every 6 hour dosing.           New Prescriptions    Details   naproxen (Naprosyn) 500 MG tablet Take 1 tablet by mouth in the morning and 1 tablet in the evening. Take with meals.Eprescribe, Disp-20 tablet, R-0      HYDROcodone-acetaminophen (NORCO) 5-325 MG per tablet Indication: Acute PainTake 1-2 tablets by mouth every 6 hours.Eprescribe, Disp-15 tablet, R-0             Past Medical History:   Diagnosis Date    Anxiety     Diverticulitis     Kidney stones     RAD (reactive airway disease) (CMD)     Seasonal allergies        Past Surgical History:   Procedure Laterality Date    BOWEL RESECTION  2018    laparoscopic partial colectomy     Dr Becerra    COLONOSCOPY      CYSTOURETHROSCOPY, WITH URETEROSCOPY AND/OR PYELOSCOPY   Left 2020    Left Ureteroscopy, Stone Basket Extraction, Laser Lithotripsy, JJ Stent - [facility]    KNEE SURGERY Left     scope    LAPAROSCOPIC APPENDECTOMY  2017    [facility]    ROTATOR CUFF REPAIR Left     VASECTOMY      WISDOM TOOTH EXTRACTION         Family History   Adopted: Yes   Problem Relation Age of Onset    Lung Disease Paternal Grandfather     Cancer Paternal Grandfather         lung    Other Father         pancreatitis    Alcohol Abuse Father     Heart disease Paternal Grandmother        Social History     Tobacco Use    Smoking status: Former     Current packs/day: 0.00     Average packs/day: 1.5 packs/day for 23.4 years (35.1 ttl pk-yrs)     Types: Cigarettes     Start date: 1990     Quit date: 2013     Years since quittin.2    Smokeless tobacco: Never   Vaping Use    Vaping status: Every Day    Substances: Nicotine   Substance Use Topics    Alcohol use: Not Currently    Drug use: Yes     Types: Marijuana     Comment: Last use 2 days ago       E-cigarette/Vaping    E-Cigarette/Vaping Use Current Every Day  User     Cartridges / Day .5      E-Cigarette/Vaping Substances & Devices    Nicotine Yes     THC No     CBD No     Flavoring No        Review of Systems   Constitutional: Negative.    HENT: Negative.     Eyes: Negative.    Respiratory: Negative.     Cardiovascular: Negative.    Gastrointestinal: Negative.    Endocrine: Negative.    Genitourinary: Negative.    Musculoskeletal:  Positive for arthralgias.   Skin: Negative.    Allergic/Immunologic: Negative.    Neurological: Negative.    Hematological: Negative.    Psychiatric/Behavioral: Negative.     All other systems reviewed and are negative.      Physical Exam     ED Triage Vitals [09/11/24 1915]   ED Triage Vitals Group      Temp 97.9 °F (36.6 °C)      Heart Rate 78      Resp 18      BP (!) 140/89      SpO2 96 %      EtCO2 mmHg       Height 5' 8\" (1.727 m)      Weight 253 lb 12 oz (115.1 kg)      Weight Scale Used Scale in bed      BMI (Calculated) 38.58      IBW/kg (Calculated) 68.4       Physical Exam  Vitals and nursing note reviewed.   Constitutional:       Appearance: Normal appearance.   HENT:      Head: Normocephalic and atraumatic.   Eyes:      Extraocular Movements: Extraocular movements intact.      Pupils: Pupils are equal, round, and reactive to light.   Cardiovascular:      Rate and Rhythm: Normal rate and regular rhythm.      Pulses: Normal pulses.      Heart sounds: Normal heart sounds.   Pulmonary:      Effort: Pulmonary effort is normal.      Breath sounds: Normal breath sounds.   Musculoskeletal:         General: Tenderness present. Normal range of motion.      Cervical back: Normal range of motion and neck supple.      Comments: Patient does have tenderness over his left proximal humerus.   Skin:     General: Skin is warm.      Capillary Refill: Capillary refill takes less than 2 seconds.      Comments: Mild abrasion noted to left knee.  Mild bruise noted to right hand.  Patient defers x-rays of these areas.   Neurological:      General: No  focal deficit present.      Mental Status: He is alert and oriented to person, place, and time. Mental status is at baseline.   Psychiatric:         Mood and Affect: Mood normal.         Behavior: Behavior normal.         Thought Content: Thought content normal.         Judgment: Judgment normal.         ED Course     Procedures    Lab Results     No results found for this visit on 09/11/24.        Radiology Results     Imaging Results              XR CHEST AP OR PA (Final result)  Result time 09/11/24 20:09:55      Final result                   Impression:    IMPRESSION:    1.  No acute pulmonary process.  2.  Increased subacromial space at the left shoulder. Please refer to the  dedicated left shoulder x-rays.    Electronically Signed by: Luma Mohan DO  Signed on: 9/11/2024 8:09 PM  Created on Workstation ID: [id number]  Signed on Workstation ID: [id number]               Narrative:    EXAM: XR CHEST AP OR PA    COMPARISON: 03/10/2024    HISTORY: Fall    FINDINGS: The trachea is midline. The cardiomediastinal silhouette is  unremarkable. Lung fields are well expanded. No focal infiltrate, effusion,  or pneumothorax. Increased subacromial space is noted at the left shoulder.                                       XR CLAVICLE LEFT (Final result)  Result time 09/11/24 20:12:47      Final result                   Impression:    IMPRESSION:    Left clavicle: No acute fracture. Acromioclavicular and sternoclavicular  alignment appear within normal limits. There are mild degenerative changes  at the acromioclavicular joint.    Left shoulder: There is a comminuted, nondisplaced fracture through the  proximal left humerus with fracture line extension at the surgical neck  extending through the metaphysis as well as at the greater tuberosity  posteriorly. There is slight inferior subluxation of the glenohumeral joint  with fluid-fluid level in the subacromial space. The acromioclavicular  joint is appropriately  aligned.    Left humerus: There is redemonstration of a comminuted, essentially  nondisplaced proximal left humeral fracture with humeral head and  neck/metaphyseal involvement. Stable appearing increased subacromial space.  Mid and distal humerus appears within normal limits. Elbow alignment is  anatomic.    Electronically Signed by: Luma Mohan DO  Signed on: 9/11/2024 8:12 PM  Created on Workstation ID: [id number]  Signed on Workstation ID: [id number]               Narrative:    EXAM: XR CLAVICLE LEFT, XR SHOULDER 2 VIEWS LEFT, XR HUMERUS 2 VIEWS LEFT    HISTORY: Fall    COMPARISON: None    FINDINGS/                                     XR HUMERUS 2 VIEWS LEFT (Final result)  Result time 09/11/24 20:12:47      Final result                   Impression:    IMPRESSION:    Left clavicle: No acute fracture. Acromioclavicular and sternoclavicular  alignment appear within normal limits. There are mild degenerative changes  at the acromioclavicular joint.    Left shoulder: There is a comminuted, nondisplaced fracture through the  proximal left humerus with fracture line extension at the surgical neck  extending through the metaphysis as well as at the greater tuberosity  posteriorly. There is slight inferior subluxation of the glenohumeral joint  with fluid-fluid level in the subacromial space. The acromioclavicular  joint is appropriately aligned.    Left humerus: There is redemonstration of a comminuted, essentially  nondisplaced proximal left humeral fracture with humeral head and  neck/metaphyseal involvement. Stable appearing increased subacromial space.  Mid and distal humerus appears within normal limits. Elbow alignment is  anatomic.    Electronically Signed by: Luma Mohan DO  Signed on: 9/11/2024 8:12 PM  Created on Workstation ID: [id number]  Signed on Workstation ID: [id number]               Narrative:    EXAM: XR CLAVICLE LEFT, XR SHOULDER 2 VIEWS LEFT, XR HUMERUS 2 VIEWS LEFT    HISTORY:  Fall    COMPARISON: None    FINDINGS/                                     XR SHOULDER 2 VIEWS LEFT (Final result)  Result time 09/11/24 20:12:47   Procedure changed from XR SHOULDER 3 VIEWS LEFT     Final result                   Impression:    IMPRESSION:    Left clavicle: No acute fracture. Acromioclavicular and sternoclavicular  alignment appear within normal limits. There are mild degenerative changes  at the acromioclavicular joint.    Left shoulder: There is a comminuted, nondisplaced fracture through the  proximal left humerus with fracture line extension at the surgical neck  extending through the metaphysis as well as at the greater tuberosity  posteriorly. There is slight inferior subluxation of the glenohumeral joint  with fluid-fluid level in the subacromial space. The acromioclavicular  joint is appropriately aligned.    Left humerus: There is redemonstration of a comminuted, essentially  nondisplaced proximal left humeral fracture with humeral head and  neck/metaphyseal involvement. Stable appearing increased subacromial space.  Mid and distal humerus appears within normal limits. Elbow alignment is  anatomic.    Electronically Signed by: Luma Mohan DO  Signed on: 9/11/2024 8:12 PM  Created on Workstation ID: [id number]  Signed on Workstation ID: [id number]               Narrative:    EXAM: XR CLAVICLE LEFT, XR SHOULDER 2 VIEWS LEFT, XR HUMERUS 2 VIEWS LEFT    HISTORY: Fall    COMPARISON: None    FINDINGS/                                    ED Medication Orders (From admission, onward)      Ordered Start     Status Ordering Provider    09/11/24 1928 09/11/24 1930  ketorolac (TORADOL) injection 30 mg  ONCE         Last MAR action: Given SOREN BURRELL                 Medical Decision Making  We did obtain x-rays.  Patient noted to have a left proximal humerus fracture.  Patient was given Toradol for pain in the ER.  He states his pain is currently controlled.  I will write him for hydrocodone and  Naprosyn for home.  We will place him in a sling.  I will place a referral to orthopedics.  Patient is agreeable with this plan.  I will write him 2 days off work and restrictions following this. They agree and understand plan.  All questions answered at bedside.  They will return if any worsening.  They defer further observation and evaluation in the emergency room.  I did communicate with them the need for close term follow-up.       Closed fracture of proximal end of left humerus, unspecified fracture morphology, initial encounter  (primary encounter diagnosis)          Clinical Impression     ED Diagnosis   1. Closed fracture of proximal end of left humerus, unspecified fracture morphology, initial encounter  SERVICE TO FAMILY PRACTICE    SERVICE TO ORTHOPEDICS    HYDROcodone-acetaminophen (NORCO) 5-325 MG per tablet          Disposition        Discharge 9/11/2024  8:38 PM  Bandar Washington discharge to home/self care.             Moises Stewart, DO  09/11/24 2207     1

## 2024-09-18 NOTE — OB PROVIDER H&P - ATTENDING COMMENTS
Patient seen and examined by me.   Agree with above assessment and plan.  44yo P0 IVF w PEC admitted at 33.6, no severe features to indicated delivery yet, but BPs labile at home that require closer in house observation at this point of 34 wks  Delivery >34wks if severe features or other maternal/fetal indications, if stable delivery at 37wks  BMZ complete  NICU consult  Sono in office SANDRA 25, vtx today  Daily fetal testing, serial labs

## 2024-09-18 NOTE — OB PROVIDER H&P - HISTORY OF PRESENT ILLNESS
Admission H&P    Subjective  HPI: Pt is a 42yo  @ 33w6d presents with elevated BPs at home and in the office today. Pt with known PEC.   Denies HA, vision changes, RUQ pain, chest pain, SOB, N/V. Patient here in triage for admission for BP monitoring.     +FM. -LOF,-CTX,-VB.     PNC: diagnosed with PEC w/o SF on     OBHx: primigravida  GynHx: endometriosis; denies hx of fibroids, cysts, abnormal Pap smears, polyps, or STDs  PMHx: denies  PSHx: s/p laparoscopy for endo ; s/p hernia repair  Med: PNV, bASA  All: NKDA  Psych: denies hx of depression or anxiety  SH: denies hx of smoking, drinking, or drug usage during the pregnancy

## 2024-09-19 LAB
ALBUMIN SERPL ELPH-MCNC: 3.1 G/DL — LOW (ref 3.3–5)
ALP SERPL-CCNC: 114 U/L — SIGNIFICANT CHANGE UP (ref 40–120)
ALT FLD-CCNC: 24 U/L — SIGNIFICANT CHANGE UP (ref 10–45)
ANION GAP SERPL CALC-SCNC: 11 MMOL/L — SIGNIFICANT CHANGE UP (ref 5–17)
APTT BLD: 25.4 SEC — SIGNIFICANT CHANGE UP (ref 24.5–35.6)
APTT BLD: 27 SEC — SIGNIFICANT CHANGE UP (ref 24.5–35.6)
AST SERPL-CCNC: 18 U/L — SIGNIFICANT CHANGE UP (ref 10–40)
BASOPHILS # BLD AUTO: 0.03 K/UL — SIGNIFICANT CHANGE UP (ref 0–0.2)
BASOPHILS NFR BLD AUTO: 0.4 % — SIGNIFICANT CHANGE UP (ref 0–2)
BILIRUB SERPL-MCNC: 0.2 MG/DL — SIGNIFICANT CHANGE UP (ref 0.2–1.2)
BUN SERPL-MCNC: 7 MG/DL — SIGNIFICANT CHANGE UP (ref 7–23)
CALCIUM SERPL-MCNC: 8.7 MG/DL — SIGNIFICANT CHANGE UP (ref 8.4–10.5)
CHLORIDE SERPL-SCNC: 106 MMOL/L — SIGNIFICANT CHANGE UP (ref 96–108)
CO2 SERPL-SCNC: 20 MMOL/L — LOW (ref 22–31)
CREAT SERPL-MCNC: 0.47 MG/DL — LOW (ref 0.5–1.3)
EGFR: 123 ML/MIN/1.73M2 — SIGNIFICANT CHANGE UP
EOSINOPHIL # BLD AUTO: 0.09 K/UL — SIGNIFICANT CHANGE UP (ref 0–0.5)
EOSINOPHIL NFR BLD AUTO: 1.1 % — SIGNIFICANT CHANGE UP (ref 0–6)
FIBRINOGEN PPP-MCNC: 242 MG/DL — SIGNIFICANT CHANGE UP (ref 200–445)
FIBRINOGEN PPP-MCNC: 284 MG/DL — SIGNIFICANT CHANGE UP (ref 200–445)
GLUCOSE SERPL-MCNC: 75 MG/DL — SIGNIFICANT CHANGE UP (ref 70–99)
HCT VFR BLD CALC: 30.1 % — LOW (ref 34.5–45)
HCT VFR BLD CALC: 32.7 % — LOW (ref 34.5–45)
HGB BLD-MCNC: 10.3 G/DL — LOW (ref 11.5–15.5)
HGB BLD-MCNC: 11 G/DL — LOW (ref 11.5–15.5)
IMM GRANULOCYTES NFR BLD AUTO: 1.3 % — HIGH (ref 0–0.9)
INR BLD: 0.94 RATIO — SIGNIFICANT CHANGE UP (ref 0.85–1.18)
INR BLD: 0.96 RATIO — SIGNIFICANT CHANGE UP (ref 0.85–1.18)
LDH SERPL L TO P-CCNC: 184 U/L — SIGNIFICANT CHANGE UP (ref 50–242)
LYMPHOCYTES # BLD AUTO: 1.25 K/UL — SIGNIFICANT CHANGE UP (ref 1–3.3)
LYMPHOCYTES # BLD AUTO: 15.9 % — SIGNIFICANT CHANGE UP (ref 13–44)
MCHC RBC-ENTMCNC: 30.5 PG — SIGNIFICANT CHANGE UP (ref 27–34)
MCHC RBC-ENTMCNC: 31 PG — SIGNIFICANT CHANGE UP (ref 27–34)
MCHC RBC-ENTMCNC: 33.6 GM/DL — SIGNIFICANT CHANGE UP (ref 32–36)
MCHC RBC-ENTMCNC: 34.2 GM/DL — SIGNIFICANT CHANGE UP (ref 32–36)
MCV RBC AUTO: 90.6 FL — SIGNIFICANT CHANGE UP (ref 80–100)
MCV RBC AUTO: 90.7 FL — SIGNIFICANT CHANGE UP (ref 80–100)
MONOCYTES # BLD AUTO: 0.55 K/UL — SIGNIFICANT CHANGE UP (ref 0–0.9)
MONOCYTES NFR BLD AUTO: 7 % — SIGNIFICANT CHANGE UP (ref 2–14)
NEUTROPHILS # BLD AUTO: 5.83 K/UL — SIGNIFICANT CHANGE UP (ref 1.8–7.4)
NEUTROPHILS NFR BLD AUTO: 74.3 % — SIGNIFICANT CHANGE UP (ref 43–77)
NRBC # BLD: 0 /100 WBCS — SIGNIFICANT CHANGE UP (ref 0–0)
NRBC # BLD: 0 /100 WBCS — SIGNIFICANT CHANGE UP (ref 0–0)
PLATELET # BLD AUTO: 189 K/UL — SIGNIFICANT CHANGE UP (ref 150–400)
PLATELET # BLD AUTO: 196 K/UL — SIGNIFICANT CHANGE UP (ref 150–400)
POTASSIUM SERPL-MCNC: 3.8 MMOL/L — SIGNIFICANT CHANGE UP (ref 3.5–5.3)
POTASSIUM SERPL-SCNC: 3.8 MMOL/L — SIGNIFICANT CHANGE UP (ref 3.5–5.3)
PROT SERPL-MCNC: 5.7 G/DL — LOW (ref 6–8.3)
PROTHROM AB SERPL-ACNC: 10.4 SEC — SIGNIFICANT CHANGE UP (ref 9.5–13)
PROTHROM AB SERPL-ACNC: 10.6 SEC — SIGNIFICANT CHANGE UP (ref 9.5–13)
RBC # BLD: 3.32 M/UL — LOW (ref 3.8–5.2)
RBC # BLD: 3.61 M/UL — LOW (ref 3.8–5.2)
RBC # FLD: 12.8 % — SIGNIFICANT CHANGE UP (ref 10.3–14.5)
RBC # FLD: 12.9 % — SIGNIFICANT CHANGE UP (ref 10.3–14.5)
SODIUM SERPL-SCNC: 137 MMOL/L — SIGNIFICANT CHANGE UP (ref 135–145)
T PALLIDUM AB TITR SER: NEGATIVE — SIGNIFICANT CHANGE UP
URATE SERPL-MCNC: 4.6 MG/DL — SIGNIFICANT CHANGE UP (ref 2.5–7)
WBC # BLD: 7.85 K/UL — SIGNIFICANT CHANGE UP (ref 3.8–10.5)
WBC # BLD: 8.15 K/UL — SIGNIFICANT CHANGE UP (ref 3.8–10.5)
WBC # FLD AUTO: 7.85 K/UL — SIGNIFICANT CHANGE UP (ref 3.8–10.5)
WBC # FLD AUTO: 8.15 K/UL — SIGNIFICANT CHANGE UP (ref 3.8–10.5)

## 2024-09-19 RX ORDER — SODIUM CITRATE AND CITRIC ACID MONOHYDRATE 334; 500 MG/5ML; MG/5ML
30 SOLUTION ORAL DAILY
Refills: 0 | Status: DISCONTINUED | OUTPATIENT
Start: 2024-09-19 | End: 2024-09-24

## 2024-09-19 RX ORDER — ASPIRIN 325 MG
81 TABLET ORAL DAILY
Refills: 0 | Status: DISCONTINUED | OUTPATIENT
Start: 2024-09-19 | End: 2024-09-24

## 2024-09-19 RX ORDER — PRENATAL VIT,CAL 76/IRON/FOLIC 29 MG-1 MG
1 TABLET ORAL DAILY
Refills: 0 | Status: DISCONTINUED | OUTPATIENT
Start: 2024-09-19 | End: 2024-09-24

## 2024-09-19 RX ORDER — 5-HYDROXYTRYPTOPHAN (5-HTP) 100 MG
3 TABLET,DISINTEGRATING ORAL AT BEDTIME
Refills: 0 | Status: DISCONTINUED | OUTPATIENT
Start: 2024-09-19 | End: 2024-09-24

## 2024-09-19 RX ADMIN — Medication 81 MILLIGRAM(S): at 12:13

## 2024-09-19 RX ADMIN — Medication 1 TABLET(S): at 12:13

## 2024-09-19 RX ADMIN — Medication 5000 UNIT(S): at 09:28

## 2024-09-19 RX ADMIN — SODIUM CITRATE AND CITRIC ACID MONOHYDRATE 30 MILLILITER(S): 334; 500 SOLUTION ORAL at 22:48

## 2024-09-19 RX ADMIN — Medication 3 MILLIGRAM(S): at 23:28

## 2024-09-19 NOTE — CHART NOTE - NSCHARTNOTEFT_GEN_A_CORE
PA Note  Additional info obtained from chart per Dr Gan.  Pt has h/o Factor 2 defic. Was on Lovenox 40 QD just during 1st trimester. IVF preg.  Office SOno 9/10: EFW 2047 (42%), ant plac, SANDRA 20, br  Alexa Toscano PA-C

## 2024-09-19 NOTE — CONSULT NOTE PEDS - ASSESSMENT
Ms. Pinzon is a 42 y/o G1PO admitted at 34w0d gestational age, for elevated blood pressures.  Maternal history unremarkable, and prenatal history notable for preeclampsia without severe features diagnosed on 24. Ms Pinzon reports that most recent EFW 2 kg (4.5 Ibs) a week ago.    PNC: diagnosed with PEC w/o SF on   OBHx: primigravida  GynHx: endometriosis; denies hx of fibroids, cysts, abnormal Pap smears, polyps, or STDs  PMHx: denies  PSHx: s/p laparoscopy for endo ; s/p hernia repair  Med: PNV, bASA  All: NKDA  Psych: denies hx of depression or anxiety  SH: denies hx of smoking, drinking, or drug usage during the pregnancy     I met with Ms. Pinzon and her partner and discussed what to expect should she deliver at 33 weeks gestation. We discussed the followin. The NICU team will be present at her delivery and will immediately assess and care for her infant.    2. The infant may require respiratory support, most commonly in the form of nasal CPAP. While unlikely, there is a small possibility that the infant would require intubation and mechanical ventilation.    3. Depending on the clinical status of the infant, enteral feedings may or may not be started immediately. The infant will receive IVF/IV nutrition as necessary. Due to immature suck/swallow, orogastric or nasogastric tube may be required once feeds are initiated. The infant is also at risk for hypoglycemia due to prematurity.    4. Discussed the benefits of breastfeeding in  infants and encouraged mother to pump following delivery.    5. The infant will be at risk for jaundice which can be treated with phototherapy.    6. The infant will be screened for infection and treated with antibiotics if deemed clinically necessary.    7. The infant is at risk for thermoregulation issues.    8. The infant is at risk for developmental delays as a consequence of prematurity. The infant will be evaluated by a developmental pediatrician to monitor for neurodevelopmental delays.    9. Length of stay is highly variable, but at this gestational age, the average length of stay is 10 days. Reviewed discharge criteria.    Ms. Pinzon and her partner had the opportunity to ask questions and may contact the NICU at any time if further questions arise.    Thank you for the opportunity to participate in the care of this patient and please inform us of any changes in her status.

## 2024-09-20 ENCOUNTER — ASOB RESULT (OUTPATIENT)
Age: 44
End: 2024-09-20

## 2024-09-20 ENCOUNTER — APPOINTMENT (OUTPATIENT)
Dept: ANTEPARTUM | Facility: CLINIC | Age: 44
End: 2024-09-20
Payer: COMMERCIAL

## 2024-09-20 LAB
ALBUMIN SERPL ELPH-MCNC: 3 G/DL — LOW (ref 3.3–5)
ALP SERPL-CCNC: 131 U/L — HIGH (ref 40–120)
ALT FLD-CCNC: 35 U/L — SIGNIFICANT CHANGE UP (ref 10–45)
ANION GAP SERPL CALC-SCNC: 12 MMOL/L — SIGNIFICANT CHANGE UP (ref 5–17)
APTT BLD: 26 SEC — SIGNIFICANT CHANGE UP (ref 24.5–35.6)
AST SERPL-CCNC: 32 U/L — SIGNIFICANT CHANGE UP (ref 10–40)
BILIRUB SERPL-MCNC: 0.2 MG/DL — SIGNIFICANT CHANGE UP (ref 0.2–1.2)
BLD GP AB SCN SERPL QL: NEGATIVE — SIGNIFICANT CHANGE UP
BUN SERPL-MCNC: 5 MG/DL — LOW (ref 7–23)
CALCIUM SERPL-MCNC: 8.6 MG/DL — SIGNIFICANT CHANGE UP (ref 8.4–10.5)
CHLORIDE SERPL-SCNC: 103 MMOL/L — SIGNIFICANT CHANGE UP (ref 96–108)
CO2 SERPL-SCNC: 20 MMOL/L — LOW (ref 22–31)
CREAT SERPL-MCNC: 0.43 MG/DL — LOW (ref 0.5–1.3)
CULTURE RESULTS: SIGNIFICANT CHANGE UP
EGFR: 125 ML/MIN/1.73M2 — SIGNIFICANT CHANGE UP
FIBRINOGEN PPP-MCNC: 278 MG/DL — SIGNIFICANT CHANGE UP (ref 200–445)
GLUCOSE SERPL-MCNC: 79 MG/DL — SIGNIFICANT CHANGE UP (ref 70–99)
GROUP B BETA STREP DNA (PCR): DETECTED
HCT VFR BLD CALC: 31.1 % — LOW (ref 34.5–45)
HGB BLD-MCNC: 10.4 G/DL — LOW (ref 11.5–15.5)
INR BLD: 1.03 RATIO — SIGNIFICANT CHANGE UP (ref 0.85–1.18)
MCHC RBC-ENTMCNC: 30.8 PG — SIGNIFICANT CHANGE UP (ref 27–34)
MCHC RBC-ENTMCNC: 33.4 GM/DL — SIGNIFICANT CHANGE UP (ref 32–36)
MCV RBC AUTO: 92 FL — SIGNIFICANT CHANGE UP (ref 80–100)
NRBC # BLD: 0 /100 WBCS — SIGNIFICANT CHANGE UP (ref 0–0)
PLATELET # BLD AUTO: 179 K/UL — SIGNIFICANT CHANGE UP (ref 150–400)
POTASSIUM SERPL-MCNC: 4 MMOL/L — SIGNIFICANT CHANGE UP (ref 3.5–5.3)
POTASSIUM SERPL-SCNC: 4 MMOL/L — SIGNIFICANT CHANGE UP (ref 3.5–5.3)
PROT SERPL-MCNC: 5.6 G/DL — LOW (ref 6–8.3)
PROTHROM AB SERPL-ACNC: 10.8 SEC — SIGNIFICANT CHANGE UP (ref 9.5–13)
RBC # BLD: 3.38 M/UL — LOW (ref 3.8–5.2)
RBC # FLD: 12.9 % — SIGNIFICANT CHANGE UP (ref 10.3–14.5)
RH IG SCN BLD-IMP: POSITIVE — SIGNIFICANT CHANGE UP
SODIUM SERPL-SCNC: 135 MMOL/L — SIGNIFICANT CHANGE UP (ref 135–145)
SOURCE GROUP B STREP: SIGNIFICANT CHANGE UP
SPECIMEN SOURCE: SIGNIFICANT CHANGE UP
WBC # BLD: 6.06 K/UL — SIGNIFICANT CHANGE UP (ref 3.8–10.5)
WBC # FLD AUTO: 6.06 K/UL — SIGNIFICANT CHANGE UP (ref 3.8–10.5)

## 2024-09-20 PROCEDURE — 76816 OB US FOLLOW-UP PER FETUS: CPT | Mod: 26

## 2024-09-20 PROCEDURE — 76819 FETAL BIOPHYS PROFIL W/O NST: CPT | Mod: 26,59

## 2024-09-20 RX ORDER — ANTI-ITCH CREAM 1 G/100G
1 OINTMENT TOPICAL
Refills: 0 | Status: DISCONTINUED | OUTPATIENT
Start: 2024-09-20 | End: 2024-09-24

## 2024-09-20 RX ORDER — DIPHENHYDRAMINE HCL 12.5MG/5ML
25 LIQUID (ML) ORAL ONCE
Refills: 0 | Status: COMPLETED | OUTPATIENT
Start: 2024-09-20 | End: 2024-09-20

## 2024-09-20 RX ORDER — DIPHENHYDRAMINE HCL 12.5MG/5ML
25 LIQUID (ML) ORAL ONCE
Refills: 0 | Status: DISCONTINUED | OUTPATIENT
Start: 2024-09-20 | End: 2024-09-20

## 2024-09-20 RX ADMIN — Medication 1 TABLET(S): at 11:47

## 2024-09-20 RX ADMIN — Medication 3 MILLIGRAM(S): at 22:10

## 2024-09-20 RX ADMIN — Medication 81 MILLIGRAM(S): at 11:47

## 2024-09-20 RX ADMIN — Medication 25 MILLIGRAM(S): at 16:30

## 2024-09-20 NOTE — LACTATION INITIAL EVALUATION - INTERVENTION OUTCOME
will reach out for additional support if questions or concerns arise prior to delivery/verbalizes understanding/demonstrates understanding of teaching

## 2024-09-20 NOTE — LACTATION INITIAL EVALUATION - LACTATION INTERVENTIONS
reviewed benefits of breastfeeding for  and for mother; encouraged early initiation of milk expression through skin to skin and breastfeeding if infant is well, by hand expression and pumping if infant requires NICU admission; reviewed how milk production works and global recommendations for breastfeeding; provided resources for additional learning

## 2024-09-21 RX ORDER — ACETAMINOPHEN 325 MG
975 TABLET ORAL ONCE
Refills: 0 | Status: COMPLETED | OUTPATIENT
Start: 2024-09-21 | End: 2024-09-21

## 2024-09-21 RX ADMIN — Medication 5000 UNIT(S): at 05:32

## 2024-09-21 RX ADMIN — Medication 975 MILLIGRAM(S): at 17:52

## 2024-09-21 RX ADMIN — ANTI-ITCH CREAM 1 APPLICATION(S): 1 OINTMENT TOPICAL at 18:09

## 2024-09-21 RX ADMIN — Medication 975 MILLIGRAM(S): at 17:22

## 2024-09-21 RX ADMIN — Medication 5000 UNIT(S): at 17:11

## 2024-09-21 RX ADMIN — Medication 81 MILLIGRAM(S): at 11:22

## 2024-09-21 RX ADMIN — Medication 3 MILLIGRAM(S): at 23:22

## 2024-09-21 RX ADMIN — Medication 1 TABLET(S): at 11:22

## 2024-09-22 RX ADMIN — Medication 5000 UNIT(S): at 05:33

## 2024-09-22 RX ADMIN — Medication 3 MILLIGRAM(S): at 22:24

## 2024-09-22 RX ADMIN — Medication 81 MILLIGRAM(S): at 12:36

## 2024-09-22 RX ADMIN — Medication 5000 UNIT(S): at 17:26

## 2024-09-22 RX ADMIN — Medication 1 TABLET(S): at 12:35

## 2024-09-23 ENCOUNTER — ASOB RESULT (OUTPATIENT)
Age: 44
End: 2024-09-23

## 2024-09-23 ENCOUNTER — APPOINTMENT (OUTPATIENT)
Dept: ANTEPARTUM | Facility: CLINIC | Age: 44
End: 2024-09-23
Payer: COMMERCIAL

## 2024-09-23 LAB
ALBUMIN SERPL ELPH-MCNC: 3.1 G/DL — LOW (ref 3.3–5)
ALP SERPL-CCNC: 141 U/L — HIGH (ref 40–120)
ALT FLD-CCNC: 28 U/L — SIGNIFICANT CHANGE UP (ref 10–45)
ANION GAP SERPL CALC-SCNC: 15 MMOL/L — SIGNIFICANT CHANGE UP (ref 5–17)
AST SERPL-CCNC: 19 U/L — SIGNIFICANT CHANGE UP (ref 10–40)
BASOPHILS # BLD AUTO: 0.02 K/UL — SIGNIFICANT CHANGE UP (ref 0–0.2)
BASOPHILS NFR BLD AUTO: 0.2 % — SIGNIFICANT CHANGE UP (ref 0–2)
BILIRUB SERPL-MCNC: 0.2 MG/DL — SIGNIFICANT CHANGE UP (ref 0.2–1.2)
BLD GP AB SCN SERPL QL: NEGATIVE — SIGNIFICANT CHANGE UP
BUN SERPL-MCNC: 9 MG/DL — SIGNIFICANT CHANGE UP (ref 7–23)
CALCIUM SERPL-MCNC: 9.1 MG/DL — SIGNIFICANT CHANGE UP (ref 8.4–10.5)
CHLORIDE SERPL-SCNC: 103 MMOL/L — SIGNIFICANT CHANGE UP (ref 96–108)
CO2 SERPL-SCNC: 18 MMOL/L — LOW (ref 22–31)
CREAT SERPL-MCNC: 0.46 MG/DL — LOW (ref 0.5–1.3)
EGFR: 123 ML/MIN/1.73M2 — SIGNIFICANT CHANGE UP
EOSINOPHIL # BLD AUTO: 0.16 K/UL — SIGNIFICANT CHANGE UP (ref 0–0.5)
EOSINOPHIL NFR BLD AUTO: 2 % — SIGNIFICANT CHANGE UP (ref 0–6)
GLUCOSE SERPL-MCNC: 76 MG/DL — SIGNIFICANT CHANGE UP (ref 70–99)
HCT VFR BLD CALC: 34.1 % — LOW (ref 34.5–45)
HGB BLD-MCNC: 11.6 G/DL — SIGNIFICANT CHANGE UP (ref 11.5–15.5)
IMM GRANULOCYTES NFR BLD AUTO: 0.7 % — SIGNIFICANT CHANGE UP (ref 0–0.9)
LDH SERPL L TO P-CCNC: 213 U/L — SIGNIFICANT CHANGE UP (ref 50–242)
LYMPHOCYTES # BLD AUTO: 1.57 K/UL — SIGNIFICANT CHANGE UP (ref 1–3.3)
LYMPHOCYTES # BLD AUTO: 19.6 % — SIGNIFICANT CHANGE UP (ref 13–44)
MCHC RBC-ENTMCNC: 30.6 PG — SIGNIFICANT CHANGE UP (ref 27–34)
MCHC RBC-ENTMCNC: 34 GM/DL — SIGNIFICANT CHANGE UP (ref 32–36)
MCV RBC AUTO: 90 FL — SIGNIFICANT CHANGE UP (ref 80–100)
MONOCYTES # BLD AUTO: 0.46 K/UL — SIGNIFICANT CHANGE UP (ref 0–0.9)
MONOCYTES NFR BLD AUTO: 5.7 % — SIGNIFICANT CHANGE UP (ref 2–14)
NEUTROPHILS # BLD AUTO: 5.75 K/UL — SIGNIFICANT CHANGE UP (ref 1.8–7.4)
NEUTROPHILS NFR BLD AUTO: 71.8 % — SIGNIFICANT CHANGE UP (ref 43–77)
NRBC # BLD: 0 /100 WBCS — SIGNIFICANT CHANGE UP (ref 0–0)
PLATELET # BLD AUTO: 214 K/UL — SIGNIFICANT CHANGE UP (ref 150–400)
POTASSIUM SERPL-MCNC: 4.4 MMOL/L — SIGNIFICANT CHANGE UP (ref 3.5–5.3)
POTASSIUM SERPL-SCNC: 4.4 MMOL/L — SIGNIFICANT CHANGE UP (ref 3.5–5.3)
PROT SERPL-MCNC: 5.9 G/DL — LOW (ref 6–8.3)
RBC # BLD: 3.79 M/UL — LOW (ref 3.8–5.2)
RBC # FLD: 12.8 % — SIGNIFICANT CHANGE UP (ref 10.3–14.5)
RH IG SCN BLD-IMP: POSITIVE — SIGNIFICANT CHANGE UP
SODIUM SERPL-SCNC: 136 MMOL/L — SIGNIFICANT CHANGE UP (ref 135–145)
URATE SERPL-MCNC: 4.8 MG/DL — SIGNIFICANT CHANGE UP (ref 2.5–7)
WBC # BLD: 8.02 K/UL — SIGNIFICANT CHANGE UP (ref 3.8–10.5)
WBC # FLD AUTO: 8.02 K/UL — SIGNIFICANT CHANGE UP (ref 3.8–10.5)

## 2024-09-23 PROCEDURE — 76818 FETAL BIOPHYS PROFILE W/NST: CPT | Mod: 26

## 2024-09-23 RX ADMIN — Medication 5000 UNIT(S): at 17:21

## 2024-09-23 RX ADMIN — Medication 81 MILLIGRAM(S): at 11:43

## 2024-09-23 RX ADMIN — Medication 3 MILLIGRAM(S): at 22:07

## 2024-09-23 RX ADMIN — SODIUM CITRATE AND CITRIC ACID MONOHYDRATE 30 MILLILITER(S): 334; 500 SOLUTION ORAL at 21:36

## 2024-09-23 RX ADMIN — Medication 5000 UNIT(S): at 05:28

## 2024-09-23 RX ADMIN — Medication 1 TABLET(S): at 11:43

## 2024-09-24 ENCOUNTER — TRANSCRIPTION ENCOUNTER (OUTPATIENT)
Age: 44
End: 2024-09-24

## 2024-09-24 VITALS
HEART RATE: 98 BPM | DIASTOLIC BLOOD PRESSURE: 80 MMHG | SYSTOLIC BLOOD PRESSURE: 119 MMHG | RESPIRATION RATE: 18 BRPM | TEMPERATURE: 99 F | OXYGEN SATURATION: 97 %

## 2024-09-24 PROCEDURE — 84156 ASSAY OF PROTEIN URINE: CPT

## 2024-09-24 PROCEDURE — 83615 LACTATE (LD) (LDH) ENZYME: CPT

## 2024-09-24 PROCEDURE — 84550 ASSAY OF BLOOD/URIC ACID: CPT

## 2024-09-24 PROCEDURE — 85730 THROMBOPLASTIN TIME PARTIAL: CPT

## 2024-09-24 PROCEDURE — 85025 COMPLETE CBC W/AUTO DIFF WBC: CPT

## 2024-09-24 PROCEDURE — 90656 IIV3 VACC NO PRSV 0.5 ML IM: CPT

## 2024-09-24 PROCEDURE — 85384 FIBRINOGEN ACTIVITY: CPT

## 2024-09-24 PROCEDURE — 81001 URINALYSIS AUTO W/SCOPE: CPT

## 2024-09-24 PROCEDURE — 87086 URINE CULTURE/COLONY COUNT: CPT

## 2024-09-24 PROCEDURE — 36415 COLL VENOUS BLD VENIPUNCTURE: CPT

## 2024-09-24 PROCEDURE — 80053 COMPREHEN METABOLIC PANEL: CPT

## 2024-09-24 PROCEDURE — 85027 COMPLETE CBC AUTOMATED: CPT

## 2024-09-24 PROCEDURE — 86901 BLOOD TYPING SEROLOGIC RH(D): CPT

## 2024-09-24 PROCEDURE — 86850 RBC ANTIBODY SCREEN: CPT

## 2024-09-24 PROCEDURE — 86780 TREPONEMA PALLIDUM: CPT

## 2024-09-24 PROCEDURE — 86900 BLOOD TYPING SEROLOGIC ABO: CPT

## 2024-09-24 PROCEDURE — 87653 STREP B DNA AMP PROBE: CPT

## 2024-09-24 PROCEDURE — 85610 PROTHROMBIN TIME: CPT

## 2024-09-24 PROCEDURE — 82570 ASSAY OF URINE CREATININE: CPT

## 2024-09-24 PROCEDURE — 76818 FETAL BIOPHYS PROFILE W/NST: CPT

## 2024-09-24 RX ORDER — PRENATAL VIT,CAL 76/IRON/FOLIC 29 MG-1 MG
1 TABLET ORAL
Qty: 0 | Refills: 0 | DISCHARGE
Start: 2024-09-24

## 2024-09-24 RX ORDER — PRENATAL VIT,CAL 76/IRON/FOLIC 29 MG-1 MG
0 TABLET ORAL
Refills: 0 | DISCHARGE

## 2024-09-24 RX ORDER — ASPIRIN 325 MG
1 TABLET ORAL
Qty: 0 | Refills: 0 | DISCHARGE
Start: 2024-09-24

## 2024-09-24 RX ORDER — ASPIRIN 325 MG
0 TABLET ORAL
Refills: 0 | DISCHARGE

## 2024-09-24 RX ADMIN — INFLUENZA VIRUS VACCINE 0.5 MILLILITER(S): 15; 15; 15; 15 SUSPENSION INTRAMUSCULAR at 16:30

## 2024-09-24 RX ADMIN — Medication 5000 UNIT(S): at 05:39

## 2024-09-24 RX ADMIN — Medication 1 TABLET(S): at 11:54

## 2024-09-24 RX ADMIN — Medication 81 MILLIGRAM(S): at 11:55

## 2024-09-24 NOTE — DISCHARGE NOTE ANTEPARTUM - ADDITIONAL INSTRUCTIONS
Continue to monitor blood pressure three times a day. Keep a log and bring to your doctor's appointment.  Call if having symptoms: headaches, blurred vision, nausea/vomiting, shortness of breath, chest pain or upper abdominal pain. Call if having any questions or concerns.  If you have contractions, leakage of fluids or vaginal bleeding.

## 2024-09-24 NOTE — PROGRESS NOTE ADULT - SUBJECTIVE AND OBJECTIVE BOX
PGY 3 Antepartum Note    Patient seen and examined at bedside in NAD. Denies any CTX, LOF or VB.  Reports good fetal movement. Denies headache, vision changes, RUQ/epigastric pain.    T(F): 98.6 (05:37)  HR: 78 (05:37)  BP: 145/82 (05:37)  RR: 18 (05:37)    Gen: NAD  Cardio: rrr, s1/s2  Pulm: ca b/l  Abd: gravid, nontender, no palpable contractions  Ext: no edema,  no calf tenderness  SVE: deferred    Medications:  acetaminophen     Tablet ..: 975 milliGRAM(s) (09-21-24 @ 17:22)  aspirin  chewable: 81 milliGRAM(s) (09-22-24 @ 12:36),  81 milliGRAM(s) (09-21-24 @ 11:22),  81 milliGRAM(s) (09-20-24 @ 11:47),  81 milliGRAM(s) (09-19-24 @ 12:13)  citric acid/sodium citrate Solution: 15 milliLiter(s) (09-18-24 @ 22:31)  citric acid/sodium citrate Solution: 30 milliLiter(s) (09-19-24 @ 22:48)  diphenhydrAMINE: 25 milliGRAM(s) (09-20-24 @ 16:30)  heparin   Injectable: 5000 Unit(s) (09-23-24 @ 05:28),  5000 Unit(s) (09-22-24 @ 17:26),  5000 Unit(s) (09-22-24 @ 05:33),  5000 Unit(s) (09-21-24 @ 17:11),  5000 Unit(s) (09-21-24 @ 05:32)  heparin   Injectable: 5000 Unit(s) (09-19-24 @ 09:28)  hydrocortisone 1% Ointment: 1 Application(s) (09-21-24 @ 18:09)  melatonin: 3 milliGRAM(s) (09-22-24 @ 22:24),  3 milliGRAM(s) (09-21-24 @ 23:22),  3 milliGRAM(s) (09-20-24 @ 22:10),  3 milliGRAM(s) (09-19-24 @ 23:28)  prenatal multivitamin: 1 Tablet(s) (09-22-24 @ 12:35),  1 Tablet(s) (09-21-24 @ 11:22),  1 Tablet(s) (09-20-24 @ 11:47),  1 Tablet(s) (09-19-24 @ 12:13)      Labs:      
R3 Antepartum Note    Patient seen and examined at bedside, no acute overnight events. No acute complaints. Pt reports +FM, denies LOF, VB, ctx, HA, epigastric pain, blurred vision, CP, SOB, N/V, fevers, and chills.    Vital Signs Last 24 Hours  T(C): 36.8 (09-20-24 @ 05:55), Max: 37.1 (09-20-24 @ 00:36)  HR: 90 (09-20-24 @ 05:55) (77 - 98)  BP: 136/84 (09-20-24 @ 05:55) (123/81 - 165/83)  RR: 18 (09-20-24 @ 05:55) (18 - 18)  SpO2: 97% (09-20-24 @ 05:55) (96% - 100%)    CAPILLARY BLOOD GLUCOSE          Physical Exam:  General: NAD  Abdomen: Soft, non-tender, gravid  Ext: No pain or swelling    NST overnight initially minimal variability and nonreactive but became reactive    Labs:             11.0   8.15  )-----------( 196      ( 09-19 @ 14:04 )             32.7     09-19 @ 07:21    137  |  106  |  7   ----------------------------<  75  3.8   |  20  |  0.47    Ca    8.7      09-19 @ 07:21    TPro  5.7  /  Alb  3.1  /  TBili  0.2  /  DBili  x   /  AST  18  /  ALT  24  /  AlkPhos  114  09-19 @ 07:21    PT/INR - ( 09-19 @ 14:04 )   PT: 10.6 sec;   INR: 0.96 ratio    PTT - ( 09-19 @ 14:04 )  PTT:25.4 sec    Uric Acid: (09-19 @ 07:21)  4.6      Fibrinogen: (09-19 @ 07:21)  --       LDH: (09-19 @ 07:21)  184        MEDICATIONS  (STANDING):  aspirin  chewable 81 milliGRAM(s) Oral daily  citric acid/sodium citrate Solution 30 milliLiter(s) Oral daily  influenza   Vaccine 0.5 milliLiter(s) IntraMuscular once  melatonin 3 milliGRAM(s) Oral at bedtime  prenatal multivitamin 1 Tablet(s) Oral daily    MEDICATIONS  (PRN):  
R3 Antepartum Note, HD#    Patient seen and examined at bedside, no acute overnight events. No acute complaints. Pt reports +FM, denies LOF, VB, ctx, HA, epigastric pain, blurred vision, CP, SOB, N/V, fevers, and chills.    Vital Signs Last 24 Hours  T(C): 36.5 (09-20-24 @ 20:18), Max: 37 (09-20-24 @ 08:53)  HR: 84 (09-20-24 @ 20:18) (82 - 90)  BP: 135/79 (09-20-24 @ 20:18) (133/83 - 143/81)  RR: 18 (09-20-24 @ 20:18) (18 - 18)  SpO2: 95% (09-20-24 @ 20:18) (95% - 98%)    CAPILLARY BLOOD GLUCOSE          Physical Exam:  General: NAD  Abdomen: Soft, non-tender, gravid  Ext: No pain or swelling    NST reactive overnight    Labs:             10.4   6.06  )-----------( 179      ( 09-20 @ 07:26 )             31.1     09-20 @ 07:23    135  |  103  |  5   ----------------------------<  79  4.0   |  20  |  0.43    Ca    8.6      09-20 @ 07:23    TPro  5.6  /  Alb  3.0  /  TBili  0.2  /  DBili  x   /  AST  32  /  ALT  35  /  AlkPhos  131  09-20 @ 07:23    PT/INR - ( 09-20 @ 07:26 )   PT: 10.8 sec;   INR: 1.03 ratio    PTT - ( 09-20 @ 07:26 )  PTT:26.0 sec    Uric Acid: (09-19 @ 07:21)  4.6      Fibrinogen: (09-19 @ 07:21)  --       LDH: (09-19 @ 07:21)  184        MEDICATIONS  (STANDING):  aspirin  chewable 81 milliGRAM(s) Oral daily  citric acid/sodium citrate Solution 30 milliLiter(s) Oral daily  heparin   Injectable 5000 Unit(s) SubCutaneous every 12 hours  hydrocortisone 1% Ointment 1 Application(s) Topical two times a day  influenza   Vaccine 0.5 milliLiter(s) IntraMuscular once  melatonin 3 milliGRAM(s) Oral at bedtime  prenatal multivitamin 1 Tablet(s) Oral daily    MEDICATIONS  (PRN):  
PGY 3 Antepartum Note    Patient seen and examined at bedside in NAD. Denies any CTX, LOF or VB.  Reports good fetal movement. Denies headache, vision changes, RUQ/epigastric pain.    T(F): 98.2 (05:29)  HR: 77 (05:29)  BP: 127/76 (05:29)  RR: 18 (05:29)      Gen: NAD  Cardio: rrr, s1/s2  Pulm: ca b/l  Abd: gravid, nontender, no palpable contractions  Ext: no edema,  no calf enderness  SVE: deferred    Medications:  acetaminophen     Tablet ..: 975 milliGRAM(s) (09-21-24 @ 17:22)  aspirin  chewable: 81 milliGRAM(s) (09-23-24 @ 11:43),  81 milliGRAM(s) (09-22-24 @ 12:36),  81 milliGRAM(s) (09-21-24 @ 11:22),  81 milliGRAM(s) (09-20-24 @ 11:47),  81 milliGRAM(s) (09-19-24 @ 12:13)  citric acid/sodium citrate Solution: 15 milliLiter(s) (09-18-24 @ 22:31)  citric acid/sodium citrate Solution: 30 milliLiter(s) (09-23-24 @ 21:36),  30 milliLiter(s) (09-19-24 @ 22:48)  diphenhydrAMINE: 25 milliGRAM(s) (09-20-24 @ 16:30)  heparin   Injectable: 5000 Unit(s) (09-24-24 @ 05:39),  5000 Unit(s) (09-23-24 @ 17:21),  5000 Unit(s) (09-23-24 @ 05:28),  5000 Unit(s) (09-22-24 @ 17:26),  5000 Unit(s) (09-22-24 @ 05:33),  5000 Unit(s) (09-21-24 @ 17:11),  5000 Unit(s) (09-21-24 @ 05:32)  heparin   Injectable: 5000 Unit(s) (09-19-24 @ 09:28)  hydrocortisone 1% Ointment: 1 Application(s) (09-21-24 @ 18:09)  melatonin: 3 milliGRAM(s) (09-23-24 @ 22:07),  3 milliGRAM(s) (09-22-24 @ 22:24),  3 milliGRAM(s) (09-21-24 @ 23:22),  3 milliGRAM(s) (09-20-24 @ 22:10),  3 milliGRAM(s) (09-19-24 @ 23:28)  prenatal multivitamin: 1 Tablet(s) (09-23-24 @ 11:43),  1 Tablet(s) (09-22-24 @ 12:35),  1 Tablet(s) (09-21-24 @ 11:22),  1 Tablet(s) (09-20-24 @ 11:47),  1 Tablet(s) (09-19-24 @ 12:13)      Labs:                        11.6   8.02  )-----------( 214      ( 23 Sep 2024 06:58 )             34.1     09-23    136  |  103  |  9   ----------------------------<  76  4.4   |  18[L]  |  0.46[L]    Ca    9.1      23 Sep 2024 07:02    TPro  5.9[L]  /  Alb  3.1[L]  /  TBili  0.2  /  DBili  x   /  AST  19  /  ALT  28  /  AlkPhos  141[H]  09-23      Urinalysis Basic - ( 23 Sep 2024 07:02 )    Color: x / Appearance: x / SG: x / pH: x  Gluc: 76 mg/dL / Ketone: x  / Bili: x / Urobili: x   Blood: x / Protein: x / Nitrite: x   Leuk Esterase: x / RBC: x / WBC x   Sq Epi: x / Non Sq Epi: x / Bacteria: x  
PGY 3 Antepartum Note    Patient seen and examined at bedside in NAD. Denies any CTX, LOF or VB.  Reports good fetal movement. Denies headache, vision changes, RUQ/epigastric pain.    T(F): 98.8 (05:52)  HR: 88 (05:52)  BP: 142/86 (05:52)  RR: 18 (05:52)    Gen: NAD  Cardio: rrr, s1/s2  Pulm: ca b/l  Abd: gravid, nontender, no palpable contractions  Ext: no edema,  no calf tenderness  SVE: deferred    Medications:    citric acid/sodium citrate Solution: 15 milliLiter(s) (09-18-24 @ 22:31)      Labs:                        10.9   8.58  )-----------( 207      ( 18 Sep 2024 22:29 )             32.1     09-18    137  |  104  |  9   ----------------------------<  96  4.2   |  20[L]  |  0.44[L]    Ca    8.9      18 Sep 2024 22:29    TPro  6.1  /  Alb  3.3  /  TBili  0.1[L]  /  DBili  x   /  AST  21  /  ALT  27  /  AlkPhos  121[H]  09-18      Urinalysis Basic - ( 18 Sep 2024 22:29 )    Color: x / Appearance: x / SG: x / pH: x  Gluc: 96 mg/dL / Ketone: x  / Bili: x / Urobili: x   Blood: x / Protein: x / Nitrite: x   Leuk Esterase: x / RBC: x / WBC x   Sq Epi: x / Non Sq Epi: x / Bacteria: x  
R3 Antepartum Note    Patient seen and examined at bedside, no acute overnight events. No acute complaints. Pt reports +FM, denies LOF, VB, ctx, HA, epigastric pain, blurred vision, CP, SOB, N/V, fevers, and chills.    Vital Signs Last 24 Hours  T(C): 36.7 (09-22-24 @ 00:36), Max: 36.9 (09-21-24 @ 05:35)  HR: 72 (09-22-24 @ 00:36) (72 - 87)  BP: 145/86 (09-22-24 @ 00:36) (122/78 - 145/86)  RR: 18 (09-22-24 @ 00:36) (17 - 18)  SpO2: 99% (09-22-24 @ 00:36) (96% - 99%)    CAPILLARY BLOOD GLUCOSE          Physical Exam:  General: NAD  Abdomen: Soft, non-tender, gravid  Ext: No pain or swelling    NST reactive overnight    Labs:             10.4   6.06  )-----------( 179      ( 09-20 @ 07:26 )             31.1     09-20 @ 07:23    135  |  103  |  5   ----------------------------<  79  4.0   |  20  |  0.43    Ca    8.6      09-20 @ 07:23    TPro  5.6  /  Alb  3.0  /  TBili  0.2  /  DBili  x   /  AST  32  /  ALT  35  /  AlkPhos  131  09-20 @ 07:23    PT/INR - ( 09-20 @ 07:26 )   PT: 10.8 sec;   INR: 1.03 ratio    PTT - ( 09-20 @ 07:26 )  PTT:26.0 sec    Uric Acid: (09-19 @ 07:21)  4.6      Fibrinogen: (09-19 @ 07:21)  --       LDH: (09-19 @ 07:21)  184        MEDICATIONS  (STANDING):  aspirin  chewable 81 milliGRAM(s) Oral daily  citric acid/sodium citrate Solution 30 milliLiter(s) Oral daily  heparin   Injectable 5000 Unit(s) SubCutaneous every 12 hours  hydrocortisone 1% Ointment 1 Application(s) Topical two times a day  influenza   Vaccine 0.5 milliLiter(s) IntraMuscular once  melatonin 3 milliGRAM(s) Oral at bedtime  prenatal multivitamin 1 Tablet(s) Oral daily    MEDICATIONS  (PRN):

## 2024-09-24 NOTE — DISCHARGE NOTE ANTEPARTUM - CARE PROVIDER_API CALL
Herminio Joseph  Obstetrics and Gynecology  [address]  [place] [address]  Phone: [phone]  Fax: [phone]  Follow Up Time:    Herminio Joseph  Obstetrics and Gynecology  [address]  [place] [address]  Phone: [phone]  Fax: [phone]  Follow Up Time: 1-3 days

## 2024-09-24 NOTE — DISCHARGE NOTE ANTEPARTUM - CARE PLAN
Principal Discharge DX:	Preeclampsia  Assessment and plan of treatment:	-Take your blood pressure daily  -If BP is >150/90 call MD  - Return to hospital with headaches, visual changes, abdominal pain, nausea, vomiting, chest pain or shortness of breathe  -Rest. Don't do anything strenuous.  - Follow up with your OB clinic as scheduled for BP check  Secondary Diagnosis:	Normal pregnancy, antepartum  Assessment and plan of treatment:	Resume normal prenatal care. Please call your doctor with any questions or concerns. Please report back to the hospital if you experience fevers, chills, nausea, vomiting, painful contractions, vaginal bleeding, loss of fluid or if you experience decreased fetal movement.   1 Principal Discharge DX:	Preeclampsia  Assessment and plan of treatment:	-Take your blood pressure daily  -If BP is >150/90 call MD  - if BP is >160/110, return to the hospital  - Return to hospital with headaches, visual changes, abdominal pain, nausea, vomiting, chest pain or shortness of breathe  -Rest. Don't do anything strenuous.  - Follow up with your OB clinic as scheduled for BP check  Secondary Diagnosis:	Normal pregnancy, antepartum  Assessment and plan of treatment:	Resume normal prenatal care. Please call your doctor with any questions or concerns. Please report back to the hospital if you experience fevers, chills, nausea, vomiting, painful contractions, vaginal bleeding, loss of fluid or if you experience decreased fetal movement.

## 2024-09-24 NOTE — DISCHARGE NOTE ANTEPARTUM - PLAN OF CARE
-Take your blood pressure daily  -If BP is >150/90 call MD  - Return to hospital with headaches, visual changes, abdominal pain, nausea, vomiting, chest pain or shortness of breathe  -Rest. Don't do anything strenuous.  - Follow up with your OB clinic as scheduled for BP check Resume normal prenatal care. Please call your doctor with any questions or concerns. Please report back to the hospital if you experience fevers, chills, nausea, vomiting, painful contractions, vaginal bleeding, loss of fluid or if you experience decreased fetal movement. -Take your blood pressure daily  -If BP is >150/90 call MD  - if BP is >160/110, return to the hospital  - Return to hospital with headaches, visual changes, abdominal pain, nausea, vomiting, chest pain or shortness of breathe  -Rest. Don't do anything strenuous.  - Follow up with your OB clinic as scheduled for BP check

## 2024-09-24 NOTE — DISCHARGE NOTE ANTEPARTUM - PATIENT PORTAL LINK FT
You can access the FollowMyHealth Patient Portal offered by [facility] by registering at the following website: [email or web address]. By joining [facility]’s FollowMyHealth portal, you will also be able to view your health information using other applications (apps) compatible with our system.

## 2024-09-24 NOTE — DISCHARGE NOTE ANTEPARTUM - MEDICATION SUMMARY - MEDICATIONS TO TAKE
I will START or STAY ON the medications listed below when I get home from the hospital:    aspirin 81 mg oral tablet, chewable  -- 1 tab(s) by mouth once a day  -- Indication: For Supervision of other normal pregnancy, antepartum    Prenatal Multivitamins with Folic Acid 1 mg oral tablet  -- 1 tab(s) by mouth once a day  -- Indication: For Supervision of other normal pregnancy, antepartum

## 2024-09-24 NOTE — PROGRESS NOTE ADULT - ATTENDING COMMENTS
Pt admitted for PEC w/o SF due to labile BPs. No sxs of sPEC except fibrinogen downtrending to 242. Will rpt at noon. Otherwise daily NST. Twice weekly BP. cw inpatient mgmt.    Rolf Gan MD
pt seen nd examined  bp -no severe range pressures  will follow in house till delivery
Pt seen and evaluated.  Has been doing well on in house bedrest, normal BPs, no severe range BPs.   She is eager to go home.    Long d/w pt re pros/cons outpt monitoring of gHTN or PEC, and need for reduced activity at home as well, vs cont in hosp mngmt, all reviewed in detail.  If her BPs remain normal here, better chance of achieving 37wks and then induction.  If she goes home and BPs go up, higher chance of needing a sooner intervention.  Pt expressed understanding and will consider.  DOUGLAS Joseph
pt seen and examined  agree with above  bp not in severe range   continue in house monitoring till delivery
pt seen and examined agree with above  will continue to follow closely

## 2024-09-24 NOTE — DISCHARGE NOTE ANTEPARTUM - HOSPITAL COURSE
Pt is a 42yo  @ 34w5d who presented with elevated BPs at home and in the office on  in the setting of known PEC w/o SF. Patient admitted to antepartum for blood pressure monitoring in the setting of having elevated blood pressures that near severe range. On admission pt had 2 severe range BP that were false (arm bent or leaning on cuff). On HD #2 patient had severe range BP that were not sustained and not >4 hours apart. Patient does not meet criteria for sPEC at this time. Overnight fetal and maternal status reassuring.   Pt is a 44yo  @ 34w5d who presented with elevated BPs at home and in the office on  in the setting of known PEC w/o SF. Patient admitted to antepartum for blood pressure monitoring in the setting of having elevated blood pressures that near severe range. On admission pt had 2 severe range BP that were false (arm bent or leaning on cuff). On HD #2 patient had severe range BP that were not sustained and not >4 hours apart. During admission, pt never met criteria for sPEC. Pt discharged on HD#7 at 34w5d. At time of discharge, pt was asymptomatic and BP were well controlled (110-120/70-80). Pt instructed to follow up in a few days with Dr. Joseph.

## 2024-09-24 NOTE — PROGRESS NOTE ADULT - ASSESSMENT
Pt is a 44yo  @ 34w2d presented with elevated BPs at home and in the office. Pt with known PEC w/o SF.   Patient admitted to antepartum for blood pressure monitoring in the setting of having elevated blood pressures that near severe range. On admission pt had 2 severe range BP that were false (arm bent or leaning on cuff). On HD #2 patient had severe range BP that were not sustained and not >4 hours apart. Patient does not meet criteria for sPEC at this time.    #PEC  - BP overnight 130-140/70-80s  - Patient will meet criteria for sPEC if she had one more severe range BP  - HELLP labs overnight wnl, fibrinogen 284  - HELLP/T&S q3d (next )    #Fetal wellbeing  - s/p BMZ in office  - NICU consult   - GBS Pos ()    #Maternal wellbeing  - Regular diet  - Ucx (): NG  - HSQ/SCDs for DVT ppx  - PNV    DTaveras PGY3
Pt is a 44yo  @ 34w3d presented with elevated BPs at home and in the office. Pt with known PEC w/o SF.  Patient admitted to antepartum for blood pressure monitoring in the setting of having elevated blood pressures that near severe range. On admission pt had 2 severe range BP that were false (arm bent or leaning on cuff). On HD #2 patient had severe range BP that were not sustained and not >4 hours apart. Patient does not meet criteria for sPEC at this time.    #PEC  - BP overnight 130-140/80-90  - Patient will meet criteria for sPEC if she had one more severe range BP  - HELLP labs overnight wnl, fibrinogen 278  - HELLP/T&S q3d (next )    #Fetal wellbeing  - s/p BMZ in office  - NICU consult   - GBS Pos ()    #Maternal wellbeing  - Regular diet  - Ucx (): NG  - HSQ/SCDs for DVT ppx  - PNV    Rachel Nails, PGY3
Assessment  Pt is a 42yo  @ 34w0d presented overnight with elevated BPs at home and in the office. Pt with known PEC w/o SF.   Patient admitted to antepartum for blood pressure monitoring in the setting of having elevated blood pressures that near severe range. Overnight pt had 2 severe range BP that were false (arm bent or leaning on cuff). She denies symptoms this morning.    #PEC  - BP overnight 129-159/63-90  - will monitor BP in triage and on ante, if severe range BP then will consider delivery.   - HELLP labs overnight wnl, fibrinogen 259  - f/u AM HELLP labs/coags    #Fetal wellbeing  - s/p BMZ in office  - NICU consult   - f/u GBS     #Maternal wellbeing  - Regular diet  - f/u Ucx ()  - HSQ/SCDs for DVT ppx  - PNV    Patricia Quinteros PGY3
Pt is a 44yo  @ 34w5d who presented with elevated BPs at home and in the office on  in the setting of known PEC w/o SF. Patient admitted to antepartum for blood pressure monitoring in the setting of having elevated blood pressures that near severe range. On admission pt had 2 severe range BP that were false (arm bent or leaning on cuff). On HD #2 patient had severe range BP that were not sustained and not >4 hours apart. Patient does not meet criteria for sPEC at this time. Overnight fetal and maternal status reassuring.    #PEC  - BP overnight 120s/60s  - Patient will meet criteria for sPEC if she has one more severe range BP  - fibrinogen: 259->242->284->278  - HELLP/T&S q3d (next )    #Fetal wellbeing  - s/p BMZ in office  - s/p NICU consult   - GBS Pos ()    #Maternal wellbeing  - Regular diet  - Ucx (): NG  - HSQ/SCDs for DVT ppx  - PNV    Patricia Quinteros PGY3  
Pt is a 44yo  @ 34w1d presented with elevated BPs at home and in the office. Pt with known PEC w/o SF.   Patient admitted to antepartum for blood pressure monitoring in the setting of having elevated blood pressures that near severe range. On admission pt had 2 severe range BP that were false (arm bent or leaning on cuff). On HD #2 patient had severe range BP that were not sustained and not >4 hours apart. Patient does not meet criteria for sPEC at this time.    #PEC  - BP overnight 120-140/80s  - Patient will meet criteria for sPEC if she had one more severe range BP  - HELLP labs overnight wnl, fibrinogen 284  - f/u AM HELLP labs/coags    #Fetal wellbeing  - s/p BMZ in office  - NICU consult   - f/u GBS     #Maternal wellbeing  - Regular diet  - Ucx (): NG  - HSQ/SCDs for DVT ppx  - PNV    Rachel Nails, PGY3 
Pt is a 44yo  @ 34w4d presented with elevated BPs at home and in the office. Pt with known PEC w/o SF.  Patient admitted to antepartum for blood pressure monitoring in the setting of having elevated blood pressures that near severe range. On admission pt had 2 severe range BP that were false (arm bent or leaning on cuff). On HD #2 patient had severe range BP that were not sustained and not >4 hours apart. Patient does not meet criteria for sPEC at this time. Overnight fetal and maternal status reassuring.    #PEC  - BP overnight 110-140/70-80  - Patient will meet criteria for sPEC if she has one more severe range BP  - fibrinogen: 259->242->284->278  - HELLP/T&S q3d (next )    #Fetal wellbeing  - s/p BMZ in office  - NICU consult   - GBS Pos ()    #Maternal wellbeing  - Regular diet  - Ucx (): NG  - HSQ/SCDs for DVT ppx  - PNV    Patricia Quinteros PGY3

## 2024-09-24 NOTE — DISCHARGE NOTE ANTEPARTUM - NS OB DC IMMUNIZATIONS MMR YN
[FreeTextEntry1] : -liver lesion - reassuring MRI - in light of age, findings size, would defer further f/u of panc cyst\par \par - GERD - cont. dietary and lifestyle modifications, including weight loss, smaller/frequent/earlier (>3h prior to lying down) meals, trials of cutting down/out caffeine, chocolate, tomatoes, fatty foods, alcohol.\par \par - Colon cancer screening - no further screening required\par \par F/U prn\par \par PMD/consultation/hospital notes and Labs/imaging/prior endoscopic results reviewed to extent noted in HPI; and, if procedure code billed on this visit for lab draw, this serves to signify that labs were drawn here in this office.\par  titers/No

## 2024-10-10 ENCOUNTER — INPATIENT (INPATIENT)
Facility: HOSPITAL | Age: 44
LOS: 6 days | Discharge: HOME | DRG: 833 | End: 2024-10-17
Attending: OBSTETRICS & GYNECOLOGY | Admitting: OBSTETRICS & GYNECOLOGY
Payer: COMMERCIAL

## 2024-10-10 VITALS — TEMPERATURE: 98 F

## 2024-10-10 DIAGNOSIS — O14.03 MILD TO MODERATE PRE-ECLAMPSIA, THIRD TRIMESTER: ICD-10-CM

## 2024-10-10 DIAGNOSIS — Z98.890 OTHER SPECIFIED POSTPROCEDURAL STATES: Chronic | ICD-10-CM

## 2024-10-10 DIAGNOSIS — Z87.42 PERSONAL HISTORY OF OTHER DISEASES OF THE FEMALE GENITAL TRACT: Chronic | ICD-10-CM

## 2024-10-10 DIAGNOSIS — O09.513 SUPERVISION OF ELDERLY PRIMIGRAVIDA, THIRD TRIMESTER: ICD-10-CM

## 2024-10-10 DIAGNOSIS — K08.409 PARTIAL LOSS OF TEETH, UNSPECIFIED CAUSE, UNSPECIFIED CLASS: Chronic | ICD-10-CM

## 2024-10-10 DIAGNOSIS — Z3A.37 37 WEEKS GESTATION OF PREGNANCY: ICD-10-CM

## 2024-10-10 LAB
ALBUMIN SERPL ELPH-MCNC: 3.3 G/DL — SIGNIFICANT CHANGE UP (ref 3.3–5)
ALP SERPL-CCNC: 167 U/L — HIGH (ref 40–120)
ALT FLD-CCNC: 20 U/L — SIGNIFICANT CHANGE UP (ref 10–45)
ANION GAP SERPL CALC-SCNC: 12 MMOL/L — SIGNIFICANT CHANGE UP (ref 5–17)
APPEARANCE UR: CLEAR — SIGNIFICANT CHANGE UP
AST SERPL-CCNC: 17 U/L — SIGNIFICANT CHANGE UP (ref 10–40)
BACTERIA # UR AUTO: NEGATIVE /HPF — SIGNIFICANT CHANGE UP
BASOPHILS # BLD AUTO: 0.01 K/UL — SIGNIFICANT CHANGE UP (ref 0–0.2)
BASOPHILS NFR BLD AUTO: 0.1 % — SIGNIFICANT CHANGE UP (ref 0–2)
BILIRUB SERPL-MCNC: 0.2 MG/DL — SIGNIFICANT CHANGE UP (ref 0.2–1.2)
BILIRUB UR-MCNC: NEGATIVE — SIGNIFICANT CHANGE UP
BLD GP AB SCN SERPL QL: NEGATIVE — SIGNIFICANT CHANGE UP
BUN SERPL-MCNC: 10 MG/DL — SIGNIFICANT CHANGE UP (ref 7–23)
CALCIUM SERPL-MCNC: 9 MG/DL — SIGNIFICANT CHANGE UP (ref 8.4–10.5)
CAST: 3 /LPF — SIGNIFICANT CHANGE UP (ref 0–4)
CHLORIDE SERPL-SCNC: 108 MMOL/L — SIGNIFICANT CHANGE UP (ref 96–108)
CO2 SERPL-SCNC: 18 MMOL/L — LOW (ref 22–31)
COLOR SPEC: YELLOW — SIGNIFICANT CHANGE UP
CREAT ?TM UR-MCNC: 41 MG/DL — SIGNIFICANT CHANGE UP
CREAT SERPL-MCNC: 0.62 MG/DL — SIGNIFICANT CHANGE UP (ref 0.5–1.3)
DIFF PNL FLD: NEGATIVE — SIGNIFICANT CHANGE UP
EGFR: 115 ML/MIN/1.73M2 — SIGNIFICANT CHANGE UP
EOSINOPHIL # BLD AUTO: 0.13 K/UL — SIGNIFICANT CHANGE UP (ref 0–0.5)
EOSINOPHIL NFR BLD AUTO: 1.7 % — SIGNIFICANT CHANGE UP (ref 0–6)
GLUCOSE SERPL-MCNC: 82 MG/DL — SIGNIFICANT CHANGE UP (ref 70–99)
GLUCOSE UR QL: NEGATIVE MG/DL — SIGNIFICANT CHANGE UP
HCT VFR BLD CALC: 30.5 % — LOW (ref 34.5–45)
HGB BLD-MCNC: 10.4 G/DL — LOW (ref 11.5–15.5)
IMM GRANULOCYTES NFR BLD AUTO: 0.5 % — SIGNIFICANT CHANGE UP (ref 0–0.9)
KETONES UR-MCNC: NEGATIVE MG/DL — SIGNIFICANT CHANGE UP
LDH SERPL L TO P-CCNC: 208 U/L — SIGNIFICANT CHANGE UP (ref 50–242)
LEUKOCYTE ESTERASE UR-ACNC: NEGATIVE — SIGNIFICANT CHANGE UP
LYMPHOCYTES # BLD AUTO: 1.71 K/UL — SIGNIFICANT CHANGE UP (ref 1–3.3)
LYMPHOCYTES # BLD AUTO: 22.2 % — SIGNIFICANT CHANGE UP (ref 13–44)
MCHC RBC-ENTMCNC: 30.4 PG — SIGNIFICANT CHANGE UP (ref 27–34)
MCHC RBC-ENTMCNC: 34.1 GM/DL — SIGNIFICANT CHANGE UP (ref 32–36)
MCV RBC AUTO: 89.2 FL — SIGNIFICANT CHANGE UP (ref 80–100)
MONOCYTES # BLD AUTO: 0.39 K/UL — SIGNIFICANT CHANGE UP (ref 0–0.9)
MONOCYTES NFR BLD AUTO: 5.1 % — SIGNIFICANT CHANGE UP (ref 2–14)
NEUTROPHILS # BLD AUTO: 5.43 K/UL — SIGNIFICANT CHANGE UP (ref 1.8–7.4)
NEUTROPHILS NFR BLD AUTO: 70.4 % — SIGNIFICANT CHANGE UP (ref 43–77)
NITRITE UR-MCNC: NEGATIVE — SIGNIFICANT CHANGE UP
NRBC # BLD: 0 /100 WBCS — SIGNIFICANT CHANGE UP (ref 0–0)
PH UR: 6 — SIGNIFICANT CHANGE UP (ref 5–8)
PLATELET # BLD AUTO: 221 K/UL — SIGNIFICANT CHANGE UP (ref 150–400)
POTASSIUM SERPL-MCNC: 4.3 MMOL/L — SIGNIFICANT CHANGE UP (ref 3.5–5.3)
POTASSIUM SERPL-SCNC: 4.3 MMOL/L — SIGNIFICANT CHANGE UP (ref 3.5–5.3)
PROT ?TM UR-MCNC: 42 MG/DL — HIGH (ref 0–12)
PROT SERPL-MCNC: 6 G/DL — SIGNIFICANT CHANGE UP (ref 6–8.3)
PROT UR-MCNC: 30 MG/DL
PROT/CREAT UR-RTO: 1 RATIO — HIGH (ref 0–0.2)
RBC # BLD: 3.42 M/UL — LOW (ref 3.8–5.2)
RBC # FLD: 12.6 % — SIGNIFICANT CHANGE UP (ref 10.3–14.5)
RBC CASTS # UR COMP ASSIST: 0 /HPF — SIGNIFICANT CHANGE UP (ref 0–4)
RH IG SCN BLD-IMP: POSITIVE — SIGNIFICANT CHANGE UP
SODIUM SERPL-SCNC: 138 MMOL/L — SIGNIFICANT CHANGE UP (ref 135–145)
SP GR SPEC: 1.01 — SIGNIFICANT CHANGE UP (ref 1–1.03)
SQUAMOUS # UR AUTO: 4 /HPF — SIGNIFICANT CHANGE UP (ref 0–5)
URATE SERPL-MCNC: 7 MG/DL — SIGNIFICANT CHANGE UP (ref 2.5–7)
UROBILINOGEN FLD QL: 0.2 MG/DL — SIGNIFICANT CHANGE UP (ref 0.2–1)
WBC # BLD: 7.71 K/UL — SIGNIFICANT CHANGE UP (ref 3.8–10.5)
WBC # FLD AUTO: 7.71 K/UL — SIGNIFICANT CHANGE UP (ref 3.8–10.5)
WBC UR QL: 2 /HPF — SIGNIFICANT CHANGE UP (ref 0–5)

## 2024-10-10 RX ORDER — DIPHENHYDRAMINE HCL 12.5MG/5ML
25 LIQUID (ML) ORAL ONCE
Refills: 0 | Status: COMPLETED | OUTPATIENT
Start: 2024-10-10 | End: 2024-10-11

## 2024-10-10 RX ORDER — AMPICILLIN TRIHYDRATE 125 MG/5ML
2 SUSPENSION, RECONSTITUTED, ORAL (ML) ORAL ONCE
Refills: 0 | Status: COMPLETED | OUTPATIENT
Start: 2024-10-10 | End: 2024-10-10

## 2024-10-10 RX ORDER — SODIUM CHLORIDE IRRIG SOLUTION 0.9 %
1000 SOLUTION, IRRIGATION IRRIGATION
Refills: 0 | Status: DISCONTINUED | OUTPATIENT
Start: 2024-10-10 | End: 2024-10-12

## 2024-10-10 RX ORDER — SODIUM CITRATE AND CITRIC ACID MONOHYDRATE 334; 500 MG/5ML; MG/5ML
15 SOLUTION ORAL EVERY 6 HOURS
Refills: 0 | Status: DISCONTINUED | OUTPATIENT
Start: 2024-10-10 | End: 2024-10-12

## 2024-10-10 RX ORDER — AMPICILLIN TRIHYDRATE 125 MG/5ML
1 SUSPENSION, RECONSTITUTED, ORAL (ML) ORAL EVERY 4 HOURS
Refills: 0 | Status: DISCONTINUED | OUTPATIENT
Start: 2024-10-10 | End: 2024-10-12

## 2024-10-10 RX ORDER — OXYTOCIN/RINGER'S LACTATE 20/500ML
167 PLASTIC BAG, INJECTION (ML) INTRAVENOUS
Qty: 30 | Refills: 0 | Status: DISCONTINUED | OUTPATIENT
Start: 2024-10-10 | End: 2024-10-12

## 2024-10-10 RX ORDER — CHLORHEXIDINE GLUCONATE ORAL RINSE 1.2 MG/ML
1 SOLUTION DENTAL DAILY
Refills: 0 | Status: DISCONTINUED | OUTPATIENT
Start: 2024-10-10 | End: 2024-10-12

## 2024-10-10 RX ADMIN — Medication 200 GRAM(S): at 19:51

## 2024-10-10 RX ADMIN — Medication 125 MILLILITER(S): at 19:27

## 2024-10-10 NOTE — OB PROVIDER H&P - NSRISKFACTORS_OBGYN_ALL_OB
CC:  Robert Brizuela   Chief Complaint   Patient presents with   • Office Visit     FOLLOW UP/ Anterior C3-4, C4-5, and C5-6 decompression with interbody fusion and plate/screw fixation 2. Use of structural allograft bone / DOS 02-06-22  EOG 05-07-22    • Follow-up     Referring MD: Michael Turpin MD  PCP: No Pcp  Medications: medications verified and updated  Refills needed today?  NO  denies known Latex allergy or symptoms of Latex sensitivity.  Patient would like communication of their results via:    Cell Phone:   Telephone Information:   Mobile [phone]     Okay to leave a message containing results? Yes  Tobacco history: verified  BMI: There is no height or weight on file to calculate BMI.                    Yes

## 2024-10-10 NOTE — OB PROVIDER H&P - ASSESSMENT
Assessment  43F  37wks gestational age admitted for an IOL for PEC, diagnosed on 24 at triage for P/C 0.4, with previous admission to antepartum from -24 without meeting criteria for sPEC.

## 2024-10-10 NOTE — OB PROVIDER H&P - NSLOWPPHRISK_OBGYN_A_OB
No previous uterine incision/Bah Pregnancy/Less than or equal to 4 previous vaginal births/No known bleeding disorder

## 2024-10-10 NOTE — OB PROVIDER H&P - HISTORY OF PRESENT ILLNESS
PA Admission H&P    Subjective  HPI: 43F  37wks gestational age presents for an IOL for PEC, diagnosed on 24 at triage for P/C 0.4. Pt was previously admitted to antepartum floor from -24 for BP monitoring and did not meet criteria for sPEC. Pt denies headaches, visual disturbances, RUQ pain, epigastric pain, or any other concerns. +FM. -LOF. -CTXs. -VB.     – PNC: PEC diagnosed on 24 with P/C 0.4. Polyhydramnios SANDRA 25.38 on 24. IVF pregnancy. GBS positive. EFW 3000g measured on sono in office 2 days ago.   – OBHx: Primigravida. Denies terminations, ectopic pregnancies, miscarriages.   – GynHx: Endometriosis s/p laparoscopic surgery . Denies fibroids, ovarian cysts, abnormal pap smears, STI.   – PMH: Denies asthma, thyroid disorders, renal/liver disease, bleeding/clotting disorders  – PSH: Endometriosis s/p laparoscopic surgery . Hernia repair 10 years ago with a mesh in place (likely epigastric). [place] tooth removal  – Psych: History of anxiety, previously on Lexapro (d/c-ed during this pregnancy), does not follow with a therapist or psychiatrist. Pt reports feeling stable overall. Denies depression.  – Social: Denies T/E/D  – Meds: PNV, [id number] daily  – Allergies: NKDA  – Will accept blood transfusions? Yes

## 2024-10-10 NOTE — OB PROVIDER H&P - NSHPREVIEWOFSYSTEMS_GEN_ALL_CORE
Pt was previously admitted to antepartum floor from 9/18-9/24/24 for BP monitoring and did not meet criteria for sPEC. Pt denies headaches, visual disturbances, RUQ pain, epigastric pain, or any other concerns. +FM. -LOF. -CTXs. -VB.

## 2024-10-10 NOTE — OB PROVIDER H&P - NS_OBGYNHISTORY_OBGYN_ALL_OB_FT
– PNC: PEC diagnosed on 9/13/24 with P/C 0.4. Polyhydramnios SANDRA 25.38 on 9/24/24. IVF pregnancy. GBS positive. EFW 3000g measured on sono in office 2 days ago.   – OBHx: Primigravida. Denies terminations, ectopic pregnancies, miscarriages.   – GynHx: Endometriosis s/p laparoscopic surgery 2023. Denies fibroids, ovarian cysts, abnormal pap smears, STI.

## 2024-10-10 NOTE — OB PROVIDER H&P - PROBLEM SELECTOR PLAN 1
Plan  1. Admit to L&D. Routine Labs. IVF.  2. PO cytotec and cervical balloon  3. Fetus: cat 1 tracing. VTX. EFW 3000g measured on sono in office 2 days ago. Continuous EFM. Sono. No concerns.  4. Prenatal issues: PEC diagnosed on 9/13/24 with P/C 0.4 (HELLP labs ordered, continue BP monitoring, and monitoring S/S sPEC). Polyhydramnios SANDRA 25.38 on 9/24/24. IVF pregnancy.   5. GBS positive: Ampicillin antibiotic prophylaxis  6. Pain: IV pain meds/epidural PRN  7. High PPH risk: 2u PRBC on hold, 2 large bore IVs, SCDs  8. History of anxiety: social work consult postpartum    Discussed with and plan per Dr. Kenyatta Patel PA-C

## 2024-10-10 NOTE — OB RN PATIENT PROFILE - FALL HARM RISK - UNIVERSAL INTERVENTIONS
ambulatory
Bed in lowest position, wheels locked, appropriate side rails in place/Call bell, personal items and telephone in reach/Instruct patient to call for assistance before getting out of bed or chair/Non-slip footwear when patient is out of bed/[place] to call system/Physically safe environment - no spills, clutter or unnecessary equipment/Purposeful Proactive Rounding/Room/bathroom lighting operational, light cord in reach

## 2024-10-10 NOTE — OB PROVIDER H&P - NSHPPHYSICALEXAM_GEN_ALL_CORE
Objective  – VS  T(C): 36.9 (10-10-24 @ 17:55)  HR: 75 (10-10-24 @ 19:39)  BP: 142/88 (10-10-24 @ 19:39)  RR: 20 (10-10-24 @ 17:55)  SpO2: 99% (10-10-24 @ 19:34)  – PE:   General: NAD  CV: RRR  Pulm: breathing comfortably on RA, clear to auscultation b/l  Abd: gravid, nontender  Extr: moving all extremities with ease, non-TTP of b/l LE  – VE: 0/0/-3  – FHT: 130/moderate variability/+accels/-decels  – Gerrard: uterine irritability  – EFW: 3000g measured on sono in office 2 days ago  – Sono: vertex

## 2024-10-10 NOTE — OB PROVIDER H&P - NSICDXPASTSURGICALHX_GEN_ALL_CORE_FT
PAST SURGICAL HISTORY:  H/O hernia repair     History of endometriosis     [place] teeth removed

## 2024-10-10 NOTE — OB RN PATIENT PROFILE - BREASTFEEDING IS BETTER ESTABLISHED WHEN INFANTS ARE ROOMING IN WITH PARENT (23/24 HOURS OF THE DAY)
2/25/2019:  Prior to surgery, H&P dated 2/22/19 reviewed along with patient's physical exam.  No changes are noted.  Blood pressure 119/83, pulse 58, temperature 98.4 °F (36.9 °C), temperature source Temporal, resp. rate 18, height 6' (1.829 m), weight 92.9 kg, SpO2 95 %.  Proceed as planned with left L5/S1 hemilaminotomy and microdiscectomy.  Risks and benefits discussed with patient/family.  Consent signed and in chart.     Statement Selected

## 2024-10-10 NOTE — PRE-ANESTHESIA EVALUATION ADULT - NSANTHOSAYNRD_GEN_A_CORE
No. JACINDA screening performed.  STOP BANG Legend: 0-2 = LOW Risk; 3-4 = INTERMEDIATE Risk; 5-8 = HIGH Risk

## 2024-10-11 LAB — T PALLIDUM AB TITR SER: NEGATIVE — SIGNIFICANT CHANGE UP

## 2024-10-11 RX ORDER — ANTI-ITCH CREAM 1 G/100G
1 OINTMENT TOPICAL
Refills: 0 | Status: DISCONTINUED | OUTPATIENT
Start: 2024-10-11 | End: 2024-10-17

## 2024-10-11 RX ORDER — OXYTOCIN/RINGER'S LACTATE 20/500ML
4 PLASTIC BAG, INJECTION (ML) INTRAVENOUS
Qty: 30 | Refills: 0 | Status: DISCONTINUED | OUTPATIENT
Start: 2024-10-11 | End: 2024-10-12

## 2024-10-11 RX ORDER — SODIUM CHLORIDE IRRIG SOLUTION 0.9 %
500 SOLUTION, IRRIGATION IRRIGATION ONCE
Refills: 0 | Status: COMPLETED | OUTPATIENT
Start: 2024-10-11 | End: 2024-10-11

## 2024-10-11 RX ORDER — ONDANSETRON HCL/PF 4 MG/2 ML
4 VIAL (ML) INJECTION ONCE
Refills: 0 | Status: COMPLETED | OUTPATIENT
Start: 2024-10-11 | End: 2024-10-11

## 2024-10-11 RX ORDER — SODIUM CHLORIDE IRRIG SOLUTION 0.9 %
500 SOLUTION, IRRIGATION IRRIGATION ONCE
Refills: 0 | Status: DISCONTINUED | OUTPATIENT
Start: 2024-10-11 | End: 2024-10-12

## 2024-10-11 RX ADMIN — Medication 4 MILLIGRAM(S): at 12:13

## 2024-10-11 RX ADMIN — Medication 108 GRAM(S): at 12:13

## 2024-10-11 RX ADMIN — ANTI-ITCH CREAM 1 APPLICATION(S): 1 OINTMENT TOPICAL at 09:31

## 2024-10-11 RX ADMIN — Medication 108 GRAM(S): at 00:05

## 2024-10-11 RX ADMIN — Medication 108 GRAM(S): at 16:47

## 2024-10-11 RX ADMIN — SODIUM CITRATE AND CITRIC ACID MONOHYDRATE 15 MILLILITER(S): 334; 500 SOLUTION ORAL at 20:10

## 2024-10-11 RX ADMIN — Medication 25 MILLIGRAM(S): at 03:46

## 2024-10-11 RX ADMIN — Medication 500 MILLILITER(S): at 22:15

## 2024-10-11 RX ADMIN — Medication 108 GRAM(S): at 20:47

## 2024-10-11 RX ADMIN — Medication 108 GRAM(S): at 08:03

## 2024-10-11 RX ADMIN — Medication 108 GRAM(S): at 04:08

## 2024-10-11 RX ADMIN — Medication 4 MILLIUNIT(S)/MIN: at 06:31

## 2024-10-11 NOTE — OB PROVIDER LABOR PROGRESS NOTE - ASSESSMENT
43y  @ 37wks/1d here for IOL for PEC    - s/p PO Cytotec  - Cervical balloon in place since 2am  - VE 1.5/50/-3  - On Pit since 630a    Plan:  - Discontinue Pit  - Restart PO Cytotec  - Reassess PRN    Plan per Dr. Mark De Los Santos, PGY-1     43y  @ 37wks/1d here for IOL for PEC    - s/p POCytotec   - Cervical balloon in place since 2am  - VE 1.5/50/-3  - On Pit since 630a now discontinued    Plan:  - Discontinue Pit  - Start BC Cytotec  - Reassess PRN    Plan per Dr. Mark De Los Santos, PGY-1

## 2024-10-11 NOTE — OB PROVIDER LABOR PROGRESS NOTE - ASSESSMENT
Plan:  43y P0 IOL for PEC  CB still firmly in place, unable to insert finger past internal os with external CB balloon deflated to fully assess effacement  Will switch to pitocin with ctx space  FHT reassuring  BPs mild range    D/w Dr. Bruno Liu-Robin PGY4

## 2024-10-11 NOTE — OB PROVIDER LABOR PROGRESS NOTE - ASSESSMENT
44yo  @37w1d admitted for IOL 2/2 PEC(wnl, 0.1), poly(25.38).    - GBS neg  - EFM / TOCO Resuscitative measures PRN  - IOL with CB@2a, vaginal balloon deflated to examine internal balloon, uterine CB found to be in place cervix felt thin, vaginal balloon reinflated and repositioned. Plan to re-evaluated balloon as approaches 24 hour troy.  - Expect     Jazzmine Carr PA-C

## 2024-10-11 NOTE — OB PROVIDER LABOR PROGRESS NOTE - ASSESSMENT
43F  37wks gestational age admitted for an IOL for PEC, diagnosed on 24 at triage for P/C 0.4, with previous admission to antepartum from -24 without meeting criteria for sPEC.    -Cervical balloon placement discussed with pt, pt verbalized understanding and agreement, all questions answered  -Cook cervical balloon placed with 60cc NS in each intrauterine and vaginal balloon without complications, pt tolerated the procedure well   -Discontinue PO cytotec  -Begin pitocin at 6am  -Continue BP monitoring  -Continue to monitor EFM/toco    Joselin Patel PA-C

## 2024-10-12 LAB
ALBUMIN SERPL ELPH-MCNC: 3 G/DL — LOW (ref 3.3–5)
ALP SERPL-CCNC: 161 U/L — HIGH (ref 40–120)
ALT FLD-CCNC: 16 U/L — SIGNIFICANT CHANGE UP (ref 10–45)
ANION GAP SERPL CALC-SCNC: 13 MMOL/L — SIGNIFICANT CHANGE UP (ref 5–17)
APTT BLD: 27.8 SEC — SIGNIFICANT CHANGE UP (ref 24.5–35.6)
AST SERPL-CCNC: 25 U/L — SIGNIFICANT CHANGE UP (ref 10–40)
BASOPHILS # BLD AUTO: 0 K/UL — SIGNIFICANT CHANGE UP (ref 0–0.2)
BASOPHILS # BLD AUTO: 0.02 K/UL — SIGNIFICANT CHANGE UP (ref 0–0.2)
BASOPHILS NFR BLD AUTO: 0 % — SIGNIFICANT CHANGE UP (ref 0–2)
BASOPHILS NFR BLD AUTO: 0.2 % — SIGNIFICANT CHANGE UP (ref 0–2)
BILIRUB SERPL-MCNC: 0.6 MG/DL — SIGNIFICANT CHANGE UP (ref 0.2–1.2)
BUN SERPL-MCNC: 8 MG/DL — SIGNIFICANT CHANGE UP (ref 7–23)
BURR CELLS BLD QL SMEAR: PRESENT — SIGNIFICANT CHANGE UP
CALCIUM SERPL-MCNC: 8.4 MG/DL — SIGNIFICANT CHANGE UP (ref 8.4–10.5)
CHLORIDE SERPL-SCNC: 109 MMOL/L — HIGH (ref 96–108)
CO2 SERPL-SCNC: 20 MMOL/L — LOW (ref 22–31)
CREAT SERPL-MCNC: 0.79 MG/DL — SIGNIFICANT CHANGE UP (ref 0.5–1.3)
EGFR: 96 ML/MIN/1.73M2 — SIGNIFICANT CHANGE UP
EOSINOPHIL # BLD AUTO: 0 K/UL — SIGNIFICANT CHANGE UP (ref 0–0.5)
EOSINOPHIL # BLD AUTO: 0.03 K/UL — SIGNIFICANT CHANGE UP (ref 0–0.5)
EOSINOPHIL NFR BLD AUTO: 0 % — SIGNIFICANT CHANGE UP (ref 0–6)
EOSINOPHIL NFR BLD AUTO: 0.3 % — SIGNIFICANT CHANGE UP (ref 0–6)
FIBRINOGEN PPP-MCNC: 394 MG/DL — SIGNIFICANT CHANGE UP (ref 200–445)
GIANT PLATELETS BLD QL SMEAR: PRESENT — SIGNIFICANT CHANGE UP
GLUCOSE SERPL-MCNC: 95 MG/DL — SIGNIFICANT CHANGE UP (ref 70–99)
HCT VFR BLD CALC: 30.5 % — LOW (ref 34.5–45)
HCT VFR BLD CALC: 33.5 % — LOW (ref 34.5–45)
HGB BLD-MCNC: 10.2 G/DL — LOW (ref 11.5–15.5)
HGB BLD-MCNC: 11.5 G/DL — SIGNIFICANT CHANGE UP (ref 11.5–15.5)
IMM GRANULOCYTES NFR BLD AUTO: 0.5 % — SIGNIFICANT CHANGE UP (ref 0–0.9)
INR BLD: 1 RATIO — SIGNIFICANT CHANGE UP (ref 0.85–1.16)
LYMPHOCYTES # BLD AUTO: 0.13 K/UL — LOW (ref 1–3.3)
LYMPHOCYTES # BLD AUTO: 0.9 % — LOW (ref 13–44)
LYMPHOCYTES # BLD AUTO: 1.12 K/UL — SIGNIFICANT CHANGE UP (ref 1–3.3)
LYMPHOCYTES # BLD AUTO: 10.3 % — LOW (ref 13–44)
MANUAL SMEAR VERIFICATION: SIGNIFICANT CHANGE UP
MCHC RBC-ENTMCNC: 30.3 PG — SIGNIFICANT CHANGE UP (ref 27–34)
MCHC RBC-ENTMCNC: 30.3 PG — SIGNIFICANT CHANGE UP (ref 27–34)
MCHC RBC-ENTMCNC: 33.4 GM/DL — SIGNIFICANT CHANGE UP (ref 32–36)
MCHC RBC-ENTMCNC: 34.3 GM/DL — SIGNIFICANT CHANGE UP (ref 32–36)
MCV RBC AUTO: 88.4 FL — SIGNIFICANT CHANGE UP (ref 80–100)
MCV RBC AUTO: 90.5 FL — SIGNIFICANT CHANGE UP (ref 80–100)
METAMYELOCYTES # FLD: 0.9 % — HIGH (ref 0–0)
MONOCYTES # BLD AUTO: 0 K/UL — SIGNIFICANT CHANGE UP (ref 0–0.9)
MONOCYTES # BLD AUTO: 0.36 K/UL — SIGNIFICANT CHANGE UP (ref 0–0.9)
MONOCYTES NFR BLD AUTO: 0 % — LOW (ref 2–14)
MONOCYTES NFR BLD AUTO: 3.3 % — SIGNIFICANT CHANGE UP (ref 2–14)
NEUTROPHILS # BLD AUTO: 14.4 K/UL — HIGH (ref 1.8–7.4)
NEUTROPHILS # BLD AUTO: 9.26 K/UL — HIGH (ref 1.8–7.4)
NEUTROPHILS NFR BLD AUTO: 85.4 % — HIGH (ref 43–77)
NEUTROPHILS NFR BLD AUTO: 96.5 % — HIGH (ref 43–77)
NEUTS BAND # BLD: 1.7 % — SIGNIFICANT CHANGE UP (ref 0–8)
NRBC # BLD: 0 /100 WBCS — SIGNIFICANT CHANGE UP (ref 0–0)
PLAT MORPH BLD: NORMAL — SIGNIFICANT CHANGE UP
PLATELET # BLD AUTO: 182 K/UL — SIGNIFICANT CHANGE UP (ref 150–400)
PLATELET # BLD AUTO: 190 K/UL — SIGNIFICANT CHANGE UP (ref 150–400)
POTASSIUM SERPL-MCNC: 4.8 MMOL/L — SIGNIFICANT CHANGE UP (ref 3.5–5.3)
POTASSIUM SERPL-SCNC: 4.8 MMOL/L — SIGNIFICANT CHANGE UP (ref 3.5–5.3)
PROT SERPL-MCNC: 6.1 G/DL — SIGNIFICANT CHANGE UP (ref 6–8.3)
PROTHROM AB SERPL-ACNC: 11.4 SEC — SIGNIFICANT CHANGE UP (ref 9.9–13.4)
RBC # BLD: 3.37 M/UL — LOW (ref 3.8–5.2)
RBC # BLD: 3.79 M/UL — LOW (ref 3.8–5.2)
RBC # FLD: 13.2 % — SIGNIFICANT CHANGE UP (ref 10.3–14.5)
RBC # FLD: 13.6 % — SIGNIFICANT CHANGE UP (ref 10.3–14.5)
RBC BLD AUTO: SIGNIFICANT CHANGE UP
SODIUM SERPL-SCNC: 142 MMOL/L — SIGNIFICANT CHANGE UP (ref 135–145)
WBC # BLD: 10.84 K/UL — HIGH (ref 3.8–10.5)
WBC # BLD: 14.66 K/UL — HIGH (ref 3.8–10.5)
WBC # FLD AUTO: 10.84 K/UL — HIGH (ref 3.8–10.5)
WBC # FLD AUTO: 14.66 K/UL — HIGH (ref 3.8–10.5)

## 2024-10-12 PROCEDURE — 88307 TISSUE EXAM BY PATHOLOGIST: CPT | Mod: 26

## 2024-10-12 PROCEDURE — 71045 X-RAY EXAM CHEST 1 VIEW: CPT | Mod: 26

## 2024-10-12 DEVICE — ARISTA 1GR: Type: IMPLANTABLE DEVICE | Status: FUNCTIONAL

## 2024-10-12 RX ORDER — ONDANSETRON HCL/PF 4 MG/2 ML
4 VIAL (ML) INJECTION EVERY 6 HOURS
Refills: 0 | Status: DISCONTINUED | OUTPATIENT
Start: 2024-10-12 | End: 2024-10-13

## 2024-10-12 RX ORDER — HYDROMORPHONE HYDROCHLORIDE 1 MG/ML
30 INJECTION, SOLUTION INTRAMUSCULAR; INTRAVENOUS; SUBCUTANEOUS
Refills: 0 | Status: DISCONTINUED | OUTPATIENT
Start: 2024-10-12 | End: 2024-10-13

## 2024-10-12 RX ORDER — SODIUM CHLORIDE IRRIG SOLUTION 0.9 %
500 SOLUTION, IRRIGATION IRRIGATION ONCE
Refills: 0 | Status: COMPLETED | OUTPATIENT
Start: 2024-10-12 | End: 2024-10-12

## 2024-10-12 RX ORDER — BUTORPHANOL TARTRATE 2 MG/ML
0.5 INJECTION, SOLUTION INTRAMUSCULAR; INTRAVENOUS EVERY 6 HOURS
Refills: 0 | Status: DISCONTINUED | OUTPATIENT
Start: 2024-10-12 | End: 2024-10-13

## 2024-10-12 RX ORDER — ACETAMINOPHEN 325 MG
975 TABLET ORAL
Refills: 0 | Status: DISCONTINUED | OUTPATIENT
Start: 2024-10-12 | End: 2024-10-17

## 2024-10-12 RX ORDER — MAGNESIUM SULFATE 500 MG/ML
4 VIAL (ML) INJECTION ONCE
Refills: 0 | Status: COMPLETED | OUTPATIENT
Start: 2024-10-12 | End: 2024-10-12

## 2024-10-12 RX ORDER — LABETALOL HYDROCHLORIDE 200 MG/1
20 TABLET, FILM COATED ORAL ONCE
Refills: 0 | Status: COMPLETED | OUTPATIENT
Start: 2024-10-12 | End: 2024-10-12

## 2024-10-12 RX ORDER — SODIUM CHLORIDE IRRIG SOLUTION 0.9 %
1000 SOLUTION, IRRIGATION IRRIGATION
Refills: 0 | Status: DISCONTINUED | OUTPATIENT
Start: 2024-10-12 | End: 2024-10-12

## 2024-10-12 RX ORDER — HYDROMORPHONE HYDROCHLORIDE 1 MG/ML
0.5 INJECTION, SOLUTION INTRAMUSCULAR; INTRAVENOUS; SUBCUTANEOUS
Refills: 0 | Status: DISCONTINUED | OUTPATIENT
Start: 2024-10-12 | End: 2024-10-13

## 2024-10-12 RX ORDER — MAGNESIUM HYDROXIDE 400 MG/5ML
30 SUSPENSION, ORAL (FINAL DOSE FORM) ORAL
Refills: 0 | Status: DISCONTINUED | OUTPATIENT
Start: 2024-10-12 | End: 2024-10-17

## 2024-10-12 RX ORDER — MAGNESIUM SULFATE 500 MG/ML
2 VIAL (ML) INJECTION
Qty: 40 | Refills: 0 | Status: DISCONTINUED | OUTPATIENT
Start: 2024-10-12 | End: 2024-10-12

## 2024-10-12 RX ORDER — DIPHENOXYLATE HCL/ATROPINE 2.5-.025MG
2 TABLET ORAL ONCE
Refills: 0 | Status: DISCONTINUED | OUTPATIENT
Start: 2024-10-12 | End: 2024-10-12

## 2024-10-12 RX ORDER — MORPHINE SULFATE 30 MG/1
2 TABLET, FILM COATED, EXTENDED RELEASE ORAL ONCE
Refills: 0 | Status: DISCONTINUED | OUTPATIENT
Start: 2024-10-12 | End: 2024-10-13

## 2024-10-12 RX ORDER — ACETAMINOPHEN 325 MG
1000 TABLET ORAL ONCE
Refills: 0 | Status: COMPLETED | OUTPATIENT
Start: 2024-10-12 | End: 2024-10-12

## 2024-10-12 RX ORDER — KETOROLAC TROMETHAMINE 10 MG/1
30 TABLET, FILM COATED ORAL EVERY 6 HOURS
Refills: 0 | Status: DISCONTINUED | OUTPATIENT
Start: 2024-10-12 | End: 2024-10-13

## 2024-10-12 RX ORDER — FUROSEMIDE 10 MG/ML
20 INJECTION INTRAVENOUS ONCE
Refills: 0 | Status: COMPLETED | OUTPATIENT
Start: 2024-10-12 | End: 2024-10-12

## 2024-10-12 RX ORDER — LEVETIRACETAM 1000 MG
500 TABLET ORAL EVERY 12 HOURS
Refills: 0 | Status: DISCONTINUED | OUTPATIENT
Start: 2024-10-12 | End: 2024-10-13

## 2024-10-12 RX ORDER — OXYCODONE HYDROCHLORIDE 30 MG/1
5 TABLET, FILM COATED, EXTENDED RELEASE ORAL ONCE
Refills: 0 | Status: DISCONTINUED | OUTPATIENT
Start: 2024-10-12 | End: 2024-10-17

## 2024-10-12 RX ORDER — NALBUPHINE HYDROCHLORIDE 10 MG/ML
2.5 INJECTION, SOLUTION INTRAMUSCULAR; INTRAVENOUS; SUBCUTANEOUS EVERY 6 HOURS
Refills: 0 | Status: DISCONTINUED | OUTPATIENT
Start: 2024-10-12 | End: 2024-10-13

## 2024-10-12 RX ORDER — DIPHENHYDRAMINE HCL 12.5MG/5ML
25 LIQUID (ML) ORAL EVERY 6 HOURS
Refills: 0 | Status: COMPLETED | OUTPATIENT
Start: 2024-10-12 | End: 2025-09-10

## 2024-10-12 RX ORDER — NALOXONE HYDROCHLORIDE 0.4 MG/ML
0.1 INJECTION, SOLUTION INTRAMUSCULAR; INTRAVENOUS; SUBCUTANEOUS
Refills: 0 | Status: DISCONTINUED | OUTPATIENT
Start: 2024-10-12 | End: 2024-10-13

## 2024-10-12 RX ORDER — OXYCODONE HYDROCHLORIDE 30 MG/1
5 TABLET, FILM COATED, EXTENDED RELEASE ORAL
Refills: 0 | Status: COMPLETED | OUTPATIENT
Start: 2024-10-12 | End: 2024-10-19

## 2024-10-12 RX ORDER — OXYTOCIN/RINGER'S LACTATE 20/500ML
42 PLASTIC BAG, INJECTION (ML) INTRAVENOUS
Qty: 30 | Refills: 0 | Status: DISCONTINUED | OUTPATIENT
Start: 2024-10-12 | End: 2024-10-17

## 2024-10-12 RX ORDER — TETANUS TOXOID, REDUCED DIPHTHERIA TOXOID AND ACELLULAR PERTUSSIS VACCINE, ADSORBED 5; 2.5; 8; 8; 2.5 [IU]/.5ML; [IU]/.5ML; UG/.5ML; UG/.5ML; UG/.5ML
0.5 SUSPENSION INTRAMUSCULAR ONCE
Refills: 0 | Status: DISCONTINUED | OUTPATIENT
Start: 2024-10-12 | End: 2024-10-17

## 2024-10-12 RX ADMIN — Medication 500 MILLIGRAM(S): at 17:50

## 2024-10-12 RX ADMIN — Medication 108 GRAM(S): at 01:05

## 2024-10-12 RX ADMIN — Medication 108 GRAM(S): at 05:05

## 2024-10-12 RX ADMIN — NALBUPHINE HYDROCHLORIDE 2.5 MILLIGRAM(S): 10 INJECTION, SOLUTION INTRAMUSCULAR; INTRAVENOUS; SUBCUTANEOUS at 16:15

## 2024-10-12 RX ADMIN — Medication 1000 MILLILITER(S): at 22:30

## 2024-10-12 RX ADMIN — KETOROLAC TROMETHAMINE 30 MILLIGRAM(S): 10 TABLET, FILM COATED ORAL at 20:55

## 2024-10-12 RX ADMIN — Medication 400 MILLIGRAM(S): at 13:09

## 2024-10-12 RX ADMIN — KETOROLAC TROMETHAMINE 30 MILLIGRAM(S): 10 TABLET, FILM COATED ORAL at 19:57

## 2024-10-12 RX ADMIN — NALBUPHINE HYDROCHLORIDE 2.5 MILLIGRAM(S): 10 INJECTION, SOLUTION INTRAMUSCULAR; INTRAVENOUS; SUBCUTANEOUS at 23:28

## 2024-10-12 RX ADMIN — Medication 50 GM/HR: at 14:00

## 2024-10-12 RX ADMIN — LABETALOL HYDROCHLORIDE 20 MILLIGRAM(S): 200 TABLET, FILM COATED ORAL at 13:25

## 2024-10-12 RX ADMIN — Medication 30 MILLIGRAM(S): at 12:40

## 2024-10-12 RX ADMIN — Medication 5000 UNIT(S): at 23:09

## 2024-10-12 RX ADMIN — Medication 300 GRAM(S): at 13:35

## 2024-10-12 RX ADMIN — Medication 10 MILLIGRAM(S): at 18:22

## 2024-10-12 RX ADMIN — Medication 125 MILLILITER(S): at 16:30

## 2024-10-12 RX ADMIN — FUROSEMIDE 20 MILLIGRAM(S): 10 INJECTION INTRAVENOUS at 17:28

## 2024-10-12 RX ADMIN — Medication 975 MILLIGRAM(S): at 22:59

## 2024-10-12 RX ADMIN — Medication 2 TABLET(S): at 12:40

## 2024-10-12 NOTE — OB RN INTRAOPERATIVE NOTE - NSSELHIDDEN_OBGYN_ALL_OB_FT
IVIG is a therapy plan so the appt requests will fall in as the patient comes in for IVIG  We will schedule when they fall in  Thank you  [NS_DeliveryAttending1_OBGYN_ALL_OB_FT:[id number]==],[NS_DeliveryAssist1_OBGYN_ALL_OB_FT:[id number]],[NS_DeliveryAttending2_OBGYN_ALL_OB_FT:[id number]],[NS_DeliveryRN_OBGYN_ALL_OB_FT:[id number]]

## 2024-10-12 NOTE — OB PROVIDER LABOR PROGRESS NOTE - NS_SUBJECTIVE/OBJECTIVE_OBGYN_ALL_OB_FT
Patient seen and evaluated at bedside as nursing reports cervical balloon is out and visible in the vagina. Patient found to be highly uncomfortable.    ICU Vital Signs Last 24 Hrs  T(C): 36.7 (11 Oct 2024 12:13), Max: 37.1 (11 Oct 2024 02:40)  T(F): 98.06 (11 Oct 2024 12:13), Max: 98.78 (11 Oct 2024 02:40)  HR: 81 (11 Oct 2024 19:35) (61 - 94)  BP: 138/80 (11 Oct 2024 19:10) (116/71 - 161/83)  RR: 17 (11 Oct 2024 12:13) (17 - 20)  SpO2: 99% (11 Oct 2024 19:35) (81% - 100%)
Labor & Delivery Progress Note     Pt seen & examined at bedside for cervical change  T(C): 37.1 (10-11-24 @ 02:40), Max: 37.1 (10-11-24 @ 02:40)  HR: 69 (10-11-24 @ 06:13) (62 - 94)  BP: 150/88 (10-11-24 @ 06:10) (116/71 - 153/78)  RR: 20 (10-10-24 @ 23:30) (20 - 20)  SpO2: 98% (10-11-24 @ 06:13) (89% - 100%)
Patient laying comfortably in bed. Reevaluated as CB has been in for 24hrs.
Patient seen for VC. Not making cervical change.
induction
Patient seen and examined for progression of labor. Effective epidural in situ.    T(C): 36.7 (10-11-24 @ 12:13), Max: 37.1 (10-11-24 @ 02:40)  HR: 71 (10-11-24 @ 16:24) (61 - 94)  BP: 149/84 (10-11-24 @ 16:12) (116/71 - 157/88)  RR: 17 (10-11-24 @ 12:13) (17 - 20)  SpO2: 98% (10-11-24 @ 16:24) (87% - 100%)
Pt evaluated at bedside for a cervical balloon placement after an epidural is placed.    ICU Vital Signs Last 24 Hrs  T(C): 36.8 (10 Oct 2024 23:30), Max: 36.9 (10 Oct 2024 17:55)  T(F): 98.24 (10 Oct 2024 22:00), Max: 98.4 (10 Oct 2024 17:55)  HR: 72 (11 Oct 2024 02:25) (71 - 94)  BP: 130/81 (11 Oct 2024 02:25) (125/79 - 153/78)  RR: 20 (10 Oct 2024 23:30) (20 - 20)  SpO2: 97% (11 Oct 2024 02:23) (93% - 100%)    O2 Parameters below as of 10 Oct 2024 17:55  Patient On (Oxygen Delivery Method): room air

## 2024-10-12 NOTE — OB PROVIDER LABOR PROGRESS NOTE - ASSESSMENT
Pt has undergone induction with cyto, cx balloon, pitocin, vag cytotec, no change >36 hours, vtx still unengaged, IBOM.  Discussed with pt and husb of failed induction despite all efforts. Primary c/s recommended and pt in agreement. Staff notified.

## 2024-10-12 NOTE — OB PROVIDER DELIVERY SUMMARY - NSSELHIDDEN_OBGYN_ALL_OB_FT
[NS_DeliveryAttending1_OBGYN_ALL_OB_FT:[id number]],[NS_DeliveryAssist1_OBGYN_ALL_OB_FT:[id number]]

## 2024-10-12 NOTE — CHART NOTE - NSCHARTNOTEFT_GEN_A_CORE
R2 Clinical Update    Pt POD#0 s/p pLTCS for failed IOL c/b PPH (QBL 1790cc) requiring TXA, CytoPR, Hemabate x2 and modified B heredia sutures. She is s/p 2u PRBCs and 1u FFP intraoperatively. In RR patient initially with adequate UOP (150-600cc/h). Pt then with low UOP (10cc/h) at 10p. 500cc LR bolus ordered. Pt with improved UOP over the last hour (50cc). Pt cleared for postpartum floor.    Earline Velasco PGY2

## 2024-10-12 NOTE — OB RN DELIVERY SUMMARY - NS_SEPSISRSKCALC_OBGYN_ALL_OB_FT
EOS calculated successfully. EOS Risk Factor: 0.5/1000 live births ([facility] national incidence); GA=37w2d; Temp=99.14; ROM=0.033; GBS='Positive'; Antibiotics='Broad spectrum antibiotics > 4 hrs prior to birth'

## 2024-10-12 NOTE — OB PROVIDER LABOR PROGRESS NOTE - NS_OBIHIFHRDETAILS_OBGYN_ALL_OB_FT
130bpm, mod nikunj, +accels, no decels
EFM: 130/mod. variability/+accels/-decels  [place]: q2 min
Reactive
reactive, cat 1
130/mod/+accels/-decels

## 2024-10-12 NOTE — OB RN DELIVERY SUMMARY - NS_DELIVERYRN_OBGYN_ALL_OB_FT
Deandra Gaona - TOY Cheiloplasty (Less Than 50%) Text: A decision was made to reconstruct the defect with a  cheiloplasty.  The defect was undermined extensively.  Additional orbicularis oris muscle was excised with a 15 blade scalpel.  The defect was converted into a full thickness wedge, of less than 50% of the vertical height of the lip, to facilite a better cosmetic result.  Small vessels were then tied off with 5-0 monocyrl. The orbicularis oris, superficial fascia, adipose and dermis were then reapproximated.  After the deeper layers were approximated the epidermis was reapproximated with particular care given to realign the vermilion border.

## 2024-10-12 NOTE — OB PROVIDER LABOR PROGRESS NOTE - ASSESSMENT
VCx2 @540a  CE as indicated  FHT monitoring    DTaveras PGY3 VCx2 @540a  CE as indicated  FHT monitoring    DTaveras PGY3    Pt has been given 24 hrs cervical balloon, po cytotec then 12 hours pitocin with no change. Decision last night to stop pitocin and try vaginal cytotec. Vtx conts to be high due to polyhydramnios and IBOW unable to AROM.    If VC doesn't cause significant change or progress pt aware decision to c/s will be made.   Genaro DANIEL attending

## 2024-10-12 NOTE — OB RN DELIVERY SUMMARY - NSSELHIDDEN_OBGYN_ALL_OB_FT
[NS_DeliveryAttending1_OBGYN_ALL_OB_FT:[id number]],[NS_DeliveryAssist1_OBGYN_ALL_OB_FT:[id number]],[NS_DeliveryAttending2_OBGYN_ALL_OB_FT:[id number]],[NS_DeliveryRN_OBGYN_ALL_OB_FT:[id number]]

## 2024-10-12 NOTE — OB NEONATOLOGY/PEDIATRICIAN DELIVERY SUMMARY - NSPEDSNEONOTESA_OBGYN_ALL_OB_FT
Code 100 called. 37.2 week old infant born via  (failed induction) to a 44 y/o  mother. Maternal labs: Blood Type: A positive, Hep B negative, Hep C negative, Rubella Immune, RPR Negative, HIV negative, GBS Positive ()- Ampx9. Maternal PMHX and PSHx includes: endometriosis, hernia surgery, anxiety (no medications). This was an IVF pregnancy. Prenatal course complicated by preeclampsia and polyhydramnios. AROM at delivery, clear. Infant reported to be stunned at birth, delayed cord clamping not performed. PPV started by nursing team due to Heart Rate >60 but <100. Arrived at 1.5 minutes of life, infant with poor color. Infant suctioned and stimulated and had a good cry. Infant transitioned to Room Air. At 6 minutes of life, infant with increased work of breathing. Started on CPAP 5 with inability to wean off. Transferred to NICU. Mom wants to breastfeed exclusively. Mom consents to Hep B. EOS Score=0.06.

## 2024-10-12 NOTE — OB PROVIDER DELIVERY SUMMARY - NSPROVIDERDELIVERYNOTE_OBGYN_ALL_OB_FT
2560g, 6/9 APGARS, cephalic presentation.    Normal uterus, tubes, and ovaries. Placenta moderately adhered to the fundus, removed completely in pieces. Uterine atony noted, s/p modified B-heredia sutures, extra pitocin, rectal cytotec, TXA, hemabate x2.    Hysterotomy was closed in 1 layers with chromic. Peritoneum reapproximated with vicryl. Fascia closed with vicryl. Subcutaneous reapproximated with plain gut. Subcuticular skin closure with vicryl on Augie needle.    EBL: 1790  IVF: 1500+2uPRBC+1uFFP  UOP: 130    Dictation #:    L Panella PGY2 2560g, 6/9 APGARS, cephalic presentation.    Normal uterus, tubes, and ovaries. Placenta moderately adhered to the fundus, removed completely in pieces. Extra pitocin, rectal cytotec, TXA, hemabate x2 given for atony    Hysterotomy was closed in 1 layers with chromic. 3 modified B-heredia sutures placed with biosyn. Miguel Angel powder placed in the bladder flap. Peritoneum reapproximated with vicryl. Fascia closed with vicryl. Subcutaneous reapproximated with plain gut. Subcuticular skin closure with vicryl on Augie needle.    EBL: 1790  IVF: 1500+2uPRBC+1uFFP  UOP: 130    Dictation #: [id number]    L Panella PGY2 2560g, 6/9 APGARS, cephalic presentation.    Normal uterus, tubes, and ovaries. Placenta moderately adhered to the right fundus, removed completely in pieces. Extra pitocin, rectal cytotec, TXA, hemabate x2 given for atony    Hysterotomy was closed in 1 layers with chromic. 3 modified B-heredia sutures placed with biosyn. Miguel Angel powder placed in the bladder flap. Peritoneum reapproximated with vicryl. Fascia closed with vicryl. Subcutaneous reapproximated with plain gut. Subcuticular skin closure with vicryl on Augie needle.    EBL: 1790  IVF: 1500+2uPRBC+1uFFP  UOP: 130    Dictation #: [id number]    L Rupal PGY2

## 2024-10-12 NOTE — OB PROVIDER LABOR PROGRESS NOTE - ASSESSMENT
42yo  @37w1d admitted for IOL 2/2 PEC(wnl, 0.1), poly(25.38).    -Cat I tracing, ctx irregularly.  -CB in for 24hrs, cervix thinning, but not making change.  -Continue maternal/fetal monitoring.  -Bedside sono, baby vertex position.  -Will continue with VC x 1, to be reassessed before next dose.    D/W Dr. Mark Frank MD PGY3  Alice Santiago MD PGY1

## 2024-10-12 NOTE — CHART NOTE - NSCHARTNOTEFT_GEN_A_CORE
Pt seen at bedside for re-evaluation. Pt underwent 3 day IOL followed by C/S complicated by uterine atony and PPH requiring 2u PRBC + 1FFP.     Seen at bedside with attending anesthesiologist.   Currently on 2L NC saturating at 94% supine positioning and while talking. No increased work of breathing. Not visually tachypneic.   HR: 70-80s  BP: 120-140s/ 70-80s  Attempt to wean off of NC. Pt desaturated to 88%.   +Rales in lower lung fields. CXR preformed - preliminary negative.    Abdomen is appropriately tender. Non distended. No rebound / guarding.  Lochia appropriate.     Persistent desaturation attributed to sedation used during C/S and pulmonary edema.   Will stop Magnesium. Transition to Keppra 500 BID   Lasix 20mg IVP x1.   Mae in place. Monitor UOP.   Continue O2 therapy. Wean as tolerated to O2 sat >92%   Will keep patient on continuous pulse oz and sitting position.     Will continue to monitor.   DW: Dr. Mark Granados, PGY-4

## 2024-10-12 NOTE — CHART NOTE - NSCHARTNOTEFT_GEN_A_CORE
Pt seen and evaluated at bedside after review of vital signs showed desaturation to 80. Pt recently post-op and s/p MTP w/ transfusion of 2uPRBC and 1u FFP. Pt also met criteria for sPEC in the OR with sustained severe range BP and was recently started on Mg. Pt reports that she feels thirsty and tired. She denies dizziness/lightheadedness, SOB, palpitations, chest pain. Denies headache, vision changes, RUQ/epigastric pain.    Vitals:  HR: 80s  BP: 130s/80s  O2Sat: 80-85    Exam:  GA: NAD  CV: RRR  Pulm: lungs clear to auscultation in all lung fields  Abd: soft, appropriately tender, fundus firm  Pelvic: minimal lochia on pad    Labs:                        11.5   14.66 )-----------( 190      ( 12 Oct 2024 13:09 )             33.5   10-12-24 @ 13:09    142  |  109[H]  |  8             --------------------------< 95     4.8  |  20[L]  | 0.79    eGFR AA: --  eGFR N-AA: --    Calcium: 8.4  Phosphorus: --  Magnesium: --    AST: 25    ALT: 16  AlkPhos: 161[H]  Protein: 6.1  Albumin: 3.0[L]  TBili: 0.6  D-Bili: --    PT: 11.4  PTT: 27.8  Fibrinogen: 394      A/P: 43y  recently postop from a primary  section complicated by PPH with QBL 1790 s/p 2u PRBC, 1u FFP and multiple uterotonics now desaturating in PACU. Pt is s/p ketamine in the OR because of insufficient pain relief with her epidural. Pt denies symptoms and has benign exam. STAT labs stable from prior, suggesting appropriate resuscitation.    - O2 delivery via NC -> titrated to 5L  - Will attempt to wean from NC as patient recovers in PACU  - lungs clear to auscultation   - CXR ordered urgently  - closely monitor vital signs  - continue Mg at this time, will consider switching to Keppra for seizure prophylaxis if CXR not clear    D/w Dr. Digna Quinteros PGY3 Pt seen and evaluated at bedside after review of vital signs showed desaturation to 80. Pt recently post-op and s/p MTP w/ transfusion of 2uPRBC and 1u FFP. Pt also met criteria for sPEC in the OR with sustained severe range BP and was recently started on Mg. Pt reports that she feels thirsty and tired. She denies dizziness/lightheadedness, SOB, palpitations, chest pain. Denies headache, vision changes, RUQ/epigastric pain.    Vitals:  HR: 80s  BP: 130s/80s  O2Sat: 80-85    Exam:  GA: NAD  CV: RRR  Pulm: lungs clear to auscultation in all lung fields  Abd: soft, appropriately tender, fundus firm  Pelvic: minimal lochia on pad    Labs:                        11.5   14.66 )-----------( 190      ( 12 Oct 2024 13:09 )             33.5   10-12-24 @ 13:09    142  |  109[H]  |  8             --------------------------< 95     4.8  |  20[L]  | 0.79    eGFR AA: --  eGFR N-AA: --    Calcium: 8.4  Phosphorus: --  Magnesium: --    AST: 25    ALT: 16  AlkPhos: 161[H]  Protein: 6.1  Albumin: 3.0[L]  TBili: 0.6  D-Bili: --    PT: 11.4  PTT: 27.8  Fibrinogen: 394      A/P: 43y  recently postop from a primary  section complicated by PPH with QBL 1790 s/p 2u PRBC, 1u FFP and multiple uterotonics now desaturating in PACU. Pt is s/p ketamine in the OR because of insufficient pain relief with her epidural. Pt denies symptoms and has benign exam. STAT labs stable from prior, suggesting appropriate resuscitation.    - O2 delivery via NC -> titrated to 5L with appropriate rise in O2sat to >95%  - Will attempt to wean from NC as patient recovers in PACU  - lungs clear to auscultation   - CXR ordered urgently  - closely monitor vital signs  - continue Mg at this time, will consider switching to Keppra for seizure prophylaxis if CXR not clear    D/w Dr. Digna Quinteros PGY3 Pt seen and evaluated at bedside after review of vital signs showed desaturation to 80. Pt recently post-op and s/p MTP w/ transfusion of 2uPRBC and 1u FFP. Pt also met criteria for sPEC in the OR with sustained severe range BP and was recently started on Mg. Pt reports that she feels thirsty and tired. She denies dizziness/lightheadedness, SOB, palpitations, chest pain. Denies headache, vision changes, RUQ/epigastric pain.    Vitals:  HR: 80s  BP: 130s/80s  O2Sat: 80-85    Exam:  GA: NAD  CV: RRR  Pulm: lungs clear to auscultation in all lung fields  Abd: soft, appropriately tender, fundus firm  Pelvic: minimal lochia on pad    Labs:                        11.5   14.66 )-----------( 190      ( 12 Oct 2024 13:09 )             33.5   10-12-24 @ 13:09    142  |  109[H]  |  8             --------------------------< 95     4.8  |  20[L]  | 0.79    eGFR AA: --  eGFR N-AA: --    Calcium: 8.4  Phosphorus: --  Magnesium: --    AST: 25    ALT: 16  AlkPhos: 161[H]  Protein: 6.1  Albumin: 3.0[L]  TBili: 0.6  D-Bili: --    PT: 11.4  PTT: 27.8  Fibrinogen: 394      A/P: 43y  recently postop from a primary  section complicated by PPH with QBL 1790 s/p 2u PRBC, 1u FFP and multiple uterotonics now desaturating in PACU. Pt is s/p ketamine in the OR because of insufficient pain relief with her epidural. Pt denies symptoms and has benign exam. STAT labs stable from prior, suggesting appropriate resuscitation.    - O2 delivery via NC -> titrated to 5L with appropriate rise in O2sat to >95%  - Will attempt to wean from NC as patient recovers in PACU  - lungs clear to auscultation   - CXR ordered urgently  - closely monitor vital signs  - continue Mg at this time, will consider switching to Keppra for seizure prophylaxis if CXR not clear    D/w Dr. Sawyer and Dr. Mark Quinteros PGY3

## 2024-10-13 LAB
ALBUMIN SERPL ELPH-MCNC: 2.4 G/DL — LOW (ref 3.3–5)
ALP SERPL-CCNC: 115 U/L — SIGNIFICANT CHANGE UP (ref 40–120)
ALT FLD-CCNC: 11 U/L — SIGNIFICANT CHANGE UP (ref 10–45)
ANION GAP SERPL CALC-SCNC: 9 MMOL/L — SIGNIFICANT CHANGE UP (ref 5–17)
AST SERPL-CCNC: 23 U/L — SIGNIFICANT CHANGE UP (ref 10–40)
BASOPHILS # BLD AUTO: 0.02 K/UL — SIGNIFICANT CHANGE UP (ref 0–0.2)
BASOPHILS NFR BLD AUTO: 0.2 % — SIGNIFICANT CHANGE UP (ref 0–2)
BILIRUB SERPL-MCNC: 0.3 MG/DL — SIGNIFICANT CHANGE UP (ref 0.2–1.2)
BUN SERPL-MCNC: 13 MG/DL — SIGNIFICANT CHANGE UP (ref 7–23)
CALCIUM SERPL-MCNC: 7.9 MG/DL — LOW (ref 8.4–10.5)
CHLORIDE SERPL-SCNC: 104 MMOL/L — SIGNIFICANT CHANGE UP (ref 96–108)
CO2 SERPL-SCNC: 20 MMOL/L — LOW (ref 22–31)
CREAT SERPL-MCNC: 0.92 MG/DL — SIGNIFICANT CHANGE UP (ref 0.5–1.3)
EGFR: 80 ML/MIN/1.73M2 — SIGNIFICANT CHANGE UP
EOSINOPHIL # BLD AUTO: 0.04 K/UL — SIGNIFICANT CHANGE UP (ref 0–0.5)
EOSINOPHIL NFR BLD AUTO: 0.4 % — SIGNIFICANT CHANGE UP (ref 0–6)
GLUCOSE SERPL-MCNC: 77 MG/DL — SIGNIFICANT CHANGE UP (ref 70–99)
HCT VFR BLD CALC: 25.2 % — LOW (ref 34.5–45)
HCT VFR BLD CALC: 26.4 % — LOW (ref 34.5–45)
HGB BLD-MCNC: 8.8 G/DL — LOW (ref 11.5–15.5)
HGB BLD-MCNC: 9 G/DL — LOW (ref 11.5–15.5)
IMM GRANULOCYTES NFR BLD AUTO: 0.5 % — SIGNIFICANT CHANGE UP (ref 0–0.9)
LDH SERPL L TO P-CCNC: 342 U/L — HIGH (ref 50–242)
LYMPHOCYTES # BLD AUTO: 1.49 K/UL — SIGNIFICANT CHANGE UP (ref 1–3.3)
LYMPHOCYTES # BLD AUTO: 14.2 % — SIGNIFICANT CHANGE UP (ref 13–44)
MCHC RBC-ENTMCNC: 30.3 PG — SIGNIFICANT CHANGE UP (ref 27–34)
MCHC RBC-ENTMCNC: 30.3 PG — SIGNIFICANT CHANGE UP (ref 27–34)
MCHC RBC-ENTMCNC: 34.1 GM/DL — SIGNIFICANT CHANGE UP (ref 32–36)
MCHC RBC-ENTMCNC: 34.9 GM/DL — SIGNIFICANT CHANGE UP (ref 32–36)
MCV RBC AUTO: 86.9 FL — SIGNIFICANT CHANGE UP (ref 80–100)
MCV RBC AUTO: 88.9 FL — SIGNIFICANT CHANGE UP (ref 80–100)
MONOCYTES # BLD AUTO: 0.54 K/UL — SIGNIFICANT CHANGE UP (ref 0–0.9)
MONOCYTES NFR BLD AUTO: 5.1 % — SIGNIFICANT CHANGE UP (ref 2–14)
NEUTROPHILS # BLD AUTO: 8.38 K/UL — HIGH (ref 1.8–7.4)
NEUTROPHILS NFR BLD AUTO: 79.6 % — HIGH (ref 43–77)
NRBC # BLD: 0 /100 WBCS — SIGNIFICANT CHANGE UP (ref 0–0)
NRBC # BLD: 0 /100 WBCS — SIGNIFICANT CHANGE UP (ref 0–0)
PLATELET # BLD AUTO: 162 K/UL — SIGNIFICANT CHANGE UP (ref 150–400)
PLATELET # BLD AUTO: 162 K/UL — SIGNIFICANT CHANGE UP (ref 150–400)
POTASSIUM SERPL-MCNC: 4.3 MMOL/L — SIGNIFICANT CHANGE UP (ref 3.5–5.3)
POTASSIUM SERPL-SCNC: 4.3 MMOL/L — SIGNIFICANT CHANGE UP (ref 3.5–5.3)
PROT SERPL-MCNC: 4.5 G/DL — LOW (ref 6–8.3)
RBC # BLD: 2.9 M/UL — LOW (ref 3.8–5.2)
RBC # BLD: 2.97 M/UL — LOW (ref 3.8–5.2)
RBC # FLD: 14.3 % — SIGNIFICANT CHANGE UP (ref 10.3–14.5)
RBC # FLD: 14.9 % — HIGH (ref 10.3–14.5)
SODIUM SERPL-SCNC: 133 MMOL/L — LOW (ref 135–145)
URATE SERPL-MCNC: 7.3 MG/DL — HIGH (ref 2.5–7)
WBC # BLD: 10.52 K/UL — HIGH (ref 3.8–10.5)
WBC # BLD: 8.91 K/UL — SIGNIFICANT CHANGE UP (ref 3.8–10.5)
WBC # FLD AUTO: 10.52 K/UL — HIGH (ref 3.8–10.5)
WBC # FLD AUTO: 8.91 K/UL — SIGNIFICANT CHANGE UP (ref 3.8–10.5)

## 2024-10-13 RX ORDER — LEVETIRACETAM 1000 MG
500 TABLET ORAL ONCE
Refills: 0 | Status: COMPLETED | OUTPATIENT
Start: 2024-10-13 | End: 2024-10-13

## 2024-10-13 RX ORDER — DIPHENHYDRAMINE HCL 12.5MG/5ML
25 LIQUID (ML) ORAL EVERY 6 HOURS
Refills: 0 | Status: DISCONTINUED | OUTPATIENT
Start: 2024-10-13 | End: 2024-10-17

## 2024-10-13 RX ORDER — SODIUM CHLORIDE IRRIG SOLUTION 0.9 %
1000 SOLUTION, IRRIGATION IRRIGATION
Refills: 0 | Status: DISCONTINUED | OUTPATIENT
Start: 2024-10-13 | End: 2024-10-13

## 2024-10-13 RX ORDER — OXYCODONE HYDROCHLORIDE 30 MG/1
5 TABLET, FILM COATED, EXTENDED RELEASE ORAL
Refills: 0 | Status: DISCONTINUED | OUTPATIENT
Start: 2024-10-13 | End: 2024-10-17

## 2024-10-13 RX ADMIN — KETOROLAC TROMETHAMINE 30 MILLIGRAM(S): 10 TABLET, FILM COATED ORAL at 20:03

## 2024-10-13 RX ADMIN — Medication 975 MILLIGRAM(S): at 21:45

## 2024-10-13 RX ADMIN — Medication 30 MILLILITER(S): at 14:01

## 2024-10-13 RX ADMIN — Medication 5000 UNIT(S): at 11:00

## 2024-10-13 RX ADMIN — KETOROLAC TROMETHAMINE 30 MILLIGRAM(S): 10 TABLET, FILM COATED ORAL at 11:30

## 2024-10-13 RX ADMIN — Medication 975 MILLIGRAM(S): at 06:17

## 2024-10-13 RX ADMIN — Medication 25 MILLIGRAM(S): at 01:30

## 2024-10-13 RX ADMIN — Medication 975 MILLIGRAM(S): at 14:01

## 2024-10-13 RX ADMIN — KETOROLAC TROMETHAMINE 30 MILLIGRAM(S): 10 TABLET, FILM COATED ORAL at 11:00

## 2024-10-13 RX ADMIN — OXYCODONE HYDROCHLORIDE 5 MILLIGRAM(S): 30 TABLET, FILM COATED, EXTENDED RELEASE ORAL at 21:45

## 2024-10-13 RX ADMIN — Medication 80 MILLIGRAM(S): at 18:42

## 2024-10-13 RX ADMIN — KETOROLAC TROMETHAMINE 30 MILLIGRAM(S): 10 TABLET, FILM COATED ORAL at 04:54

## 2024-10-13 RX ADMIN — Medication 400 MILLIGRAM(S): at 06:17

## 2024-10-13 RX ADMIN — OXYCODONE HYDROCHLORIDE 5 MILLIGRAM(S): 30 TABLET, FILM COATED, EXTENDED RELEASE ORAL at 15:30

## 2024-10-13 RX ADMIN — OXYCODONE HYDROCHLORIDE 5 MILLIGRAM(S): 30 TABLET, FILM COATED, EXTENDED RELEASE ORAL at 20:53

## 2024-10-13 RX ADMIN — Medication 975 MILLIGRAM(S): at 14:30

## 2024-10-13 RX ADMIN — Medication 975 MILLIGRAM(S): at 07:17

## 2024-10-13 RX ADMIN — Medication 975 MILLIGRAM(S): at 20:51

## 2024-10-13 RX ADMIN — KETOROLAC TROMETHAMINE 30 MILLIGRAM(S): 10 TABLET, FILM COATED ORAL at 18:40

## 2024-10-13 RX ADMIN — OXYCODONE HYDROCHLORIDE 5 MILLIGRAM(S): 30 TABLET, FILM COATED, EXTENDED RELEASE ORAL at 14:49

## 2024-10-13 RX ADMIN — KETOROLAC TROMETHAMINE 30 MILLIGRAM(S): 10 TABLET, FILM COATED ORAL at 03:24

## 2024-10-13 RX ADMIN — Medication 60 MILLIGRAM(S): at 18:42

## 2024-10-13 NOTE — PROGRESS NOTE ADULT - ASSESSMENT
A/P: 44yo POD#1 s/p pLTCS for failed IOL c/b PPH (QBL 1790cc), course also c/b development of sPEC. Patient is stable and doing well post-operatively.     #sPEC  - s/p Mg for seizure prophylaxis, transitioned to Keppra q12hr on 10/12 due to concern for development of pulmonary edema  - CXR(10/12)-p: clear lungs  - s/p Pro10 (10/12), Lab10/20 (10/12)  - No standing antiHTN requirements at this time    #PPH (QBL 1790cc)  - s/p TXA, CytoPR, Hemabate x2 and modified B heredia sutures  - Received 2u PRBCs and 1u FFP intraoperatively     #PP state   - Continue regular diet.  - Increase ambulation.  - HSQ, venodynes for DVT prophylaxis  - Continue motrin, tylenol, oxycodone PRN for pain control  - F/u AM CBC    Bella Villarreal, PGY3

## 2024-10-14 LAB
BASOPHILS # BLD AUTO: 0.02 K/UL — SIGNIFICANT CHANGE UP (ref 0–0.2)
BASOPHILS NFR BLD AUTO: 0.3 % — SIGNIFICANT CHANGE UP (ref 0–2)
EOSINOPHIL # BLD AUTO: 0.15 K/UL — SIGNIFICANT CHANGE UP (ref 0–0.5)
EOSINOPHIL NFR BLD AUTO: 2.1 % — SIGNIFICANT CHANGE UP (ref 0–6)
HCT VFR BLD CALC: 25.2 % — LOW (ref 34.5–45)
HGB BLD-MCNC: 8.5 G/DL — LOW (ref 11.5–15.5)
IMM GRANULOCYTES NFR BLD AUTO: 0.4 % — SIGNIFICANT CHANGE UP (ref 0–0.9)
LYMPHOCYTES # BLD AUTO: 1.47 K/UL — SIGNIFICANT CHANGE UP (ref 1–3.3)
LYMPHOCYTES # BLD AUTO: 20.7 % — SIGNIFICANT CHANGE UP (ref 13–44)
MCHC RBC-ENTMCNC: 30.1 PG — SIGNIFICANT CHANGE UP (ref 27–34)
MCHC RBC-ENTMCNC: 33.7 GM/DL — SIGNIFICANT CHANGE UP (ref 32–36)
MCV RBC AUTO: 89.4 FL — SIGNIFICANT CHANGE UP (ref 80–100)
MONOCYTES # BLD AUTO: 0.31 K/UL — SIGNIFICANT CHANGE UP (ref 0–0.9)
MONOCYTES NFR BLD AUTO: 4.4 % — SIGNIFICANT CHANGE UP (ref 2–14)
NEUTROPHILS # BLD AUTO: 5.13 K/UL — SIGNIFICANT CHANGE UP (ref 1.8–7.4)
NEUTROPHILS NFR BLD AUTO: 72.1 % — SIGNIFICANT CHANGE UP (ref 43–77)
NRBC # BLD: 0 /100 WBCS — SIGNIFICANT CHANGE UP (ref 0–0)
PLATELET # BLD AUTO: 176 K/UL — SIGNIFICANT CHANGE UP (ref 150–400)
RBC # BLD: 2.82 M/UL — LOW (ref 3.8–5.2)
RBC # FLD: 14.6 % — HIGH (ref 10.3–14.5)
WBC # BLD: 7.11 K/UL — SIGNIFICANT CHANGE UP (ref 3.8–10.5)
WBC # FLD AUTO: 7.11 K/UL — SIGNIFICANT CHANGE UP (ref 3.8–10.5)

## 2024-10-14 RX ADMIN — Medication 975 MILLIGRAM(S): at 03:27

## 2024-10-14 RX ADMIN — Medication 975 MILLIGRAM(S): at 10:50

## 2024-10-14 RX ADMIN — OXYCODONE HYDROCHLORIDE 5 MILLIGRAM(S): 30 TABLET, FILM COATED, EXTENDED RELEASE ORAL at 03:27

## 2024-10-14 RX ADMIN — Medication 975 MILLIGRAM(S): at 22:15

## 2024-10-14 RX ADMIN — Medication 600 MILLIGRAM(S): at 13:41

## 2024-10-14 RX ADMIN — Medication 80 MILLIGRAM(S): at 12:55

## 2024-10-14 RX ADMIN — Medication 5000 UNIT(S): at 00:58

## 2024-10-14 RX ADMIN — Medication 975 MILLIGRAM(S): at 21:31

## 2024-10-14 RX ADMIN — Medication 975 MILLIGRAM(S): at 10:17

## 2024-10-14 RX ADMIN — Medication 600 MILLIGRAM(S): at 18:28

## 2024-10-14 RX ADMIN — Medication 80 MILLIGRAM(S): at 05:48

## 2024-10-14 RX ADMIN — OXYCODONE HYDROCHLORIDE 5 MILLIGRAM(S): 30 TABLET, FILM COATED, EXTENDED RELEASE ORAL at 04:00

## 2024-10-14 RX ADMIN — OXYCODONE HYDROCHLORIDE 5 MILLIGRAM(S): 30 TABLET, FILM COATED, EXTENDED RELEASE ORAL at 01:00

## 2024-10-14 RX ADMIN — OXYCODONE HYDROCHLORIDE 5 MILLIGRAM(S): 30 TABLET, FILM COATED, EXTENDED RELEASE ORAL at 10:17

## 2024-10-14 RX ADMIN — OXYCODONE HYDROCHLORIDE 5 MILLIGRAM(S): 30 TABLET, FILM COATED, EXTENDED RELEASE ORAL at 16:29

## 2024-10-14 RX ADMIN — Medication 600 MILLIGRAM(S): at 12:55

## 2024-10-14 RX ADMIN — Medication 975 MILLIGRAM(S): at 17:00

## 2024-10-14 RX ADMIN — Medication 600 MILLIGRAM(S): at 06:06

## 2024-10-14 RX ADMIN — Medication 600 MILLIGRAM(S): at 02:11

## 2024-10-14 RX ADMIN — Medication 80 MILLIGRAM(S): at 00:58

## 2024-10-14 RX ADMIN — Medication 600 MILLIGRAM(S): at 00:58

## 2024-10-14 RX ADMIN — Medication 60 MILLIGRAM(S): at 18:28

## 2024-10-14 RX ADMIN — Medication 975 MILLIGRAM(S): at 16:30

## 2024-10-14 RX ADMIN — Medication 975 MILLIGRAM(S): at 03:59

## 2024-10-14 RX ADMIN — Medication 600 MILLIGRAM(S): at 05:48

## 2024-10-14 RX ADMIN — Medication 5000 UNIT(S): at 12:56

## 2024-10-14 NOTE — PROGRESS NOTE ADULT - ASSESSMENT
44yo POD#1 s/p pLTCS for failed IOL c/b PPH (QBL 1790cc), course also c/b development of sPEC. Patient is stable and doing well post-operatively.     #sPEC  - s/p Mg for seizure prophylaxis, transitioned to Keppra q12hr on 10/12 due to concern for development of pulmonary edema  - CXR(10/12): mild pulmonary vascular congestion  - s/p Lasix x1  - s/p Pro10 (10/12), Lab10/20 (10/12)  - Procardia 60 XL daily    #PPH (QBL 1790cc)  - s/p TXA, CytoPR, Hemabate x2 and modified B heredia sutures  - Received 2u PRBCs and 1u FFP intraoperatively   - Hct 33.5->25.2->26.4  - f/u AM CBC    #PP state   - Continue regular diet.  - Increase ambulation.  - HSQ, venodynes for DVT prophylaxis  - Continue motrin, tylenol, oxycodone PRN for pain control      Rachel Nails, PGY3  44yo POD#2 s/p pLTCS for failed IOL c/b PPH (QBL 1790cc), course also c/b development of sPEC. Patient is stable and doing well post-operatively.     #sPEC  - s/p Mg for seizure prophylaxis, transitioned to Keppra q12hr on 10/12 due to concern for development of pulmonary edema  - CXR(10/12): mild pulmonary vascular congestion  - s/p Lasix x1  - s/p Pro10 (10/12), Lab10/20 (10/12)  - Procardia 60 XL daily    #PPH (QBL 1790cc)  - s/p TXA, CytoPR, Hemabate x2 and modified B heredia sutures  - Received 2u PRBCs and 1u FFP intraoperatively   - Hct 33.5->25.2->26.4  - f/u AM CBC    #PP state   - Continue regular diet.  - Increase ambulation.  - HSQ, venodynes for DVT prophylaxis  - Continue motrin, tylenol, oxycodone PRN for pain control      Rachel Nails, PGY3

## 2024-10-15 RX ORDER — FERROUS SULFATE 325(65) MG
325 TABLET ORAL THREE TIMES A DAY
Refills: 0 | Status: DISCONTINUED | OUTPATIENT
Start: 2024-10-15 | End: 2024-10-15

## 2024-10-15 RX ORDER — FERROUS SULFATE 325(65) MG
325 TABLET ORAL DAILY
Refills: 0 | Status: DISCONTINUED | OUTPATIENT
Start: 2024-10-15 | End: 2024-10-17

## 2024-10-15 RX ADMIN — Medication 500 MILLIGRAM(S): at 12:12

## 2024-10-15 RX ADMIN — Medication 600 MILLIGRAM(S): at 05:58

## 2024-10-15 RX ADMIN — Medication 600 MILLIGRAM(S): at 21:49

## 2024-10-15 RX ADMIN — Medication 5000 UNIT(S): at 00:05

## 2024-10-15 RX ADMIN — Medication 600 MILLIGRAM(S): at 16:15

## 2024-10-15 RX ADMIN — OXYCODONE HYDROCHLORIDE 5 MILLIGRAM(S): 30 TABLET, FILM COATED, EXTENDED RELEASE ORAL at 00:05

## 2024-10-15 RX ADMIN — Medication 975 MILLIGRAM(S): at 12:00

## 2024-10-15 RX ADMIN — Medication 975 MILLIGRAM(S): at 03:09

## 2024-10-15 RX ADMIN — Medication 5000 UNIT(S): at 12:12

## 2024-10-15 RX ADMIN — Medication 60 MILLIGRAM(S): at 18:46

## 2024-10-15 RX ADMIN — OXYCODONE HYDROCHLORIDE 5 MILLIGRAM(S): 30 TABLET, FILM COATED, EXTENDED RELEASE ORAL at 19:30

## 2024-10-15 RX ADMIN — Medication 975 MILLIGRAM(S): at 03:04

## 2024-10-15 RX ADMIN — OXYCODONE HYDROCHLORIDE 5 MILLIGRAM(S): 30 TABLET, FILM COATED, EXTENDED RELEASE ORAL at 02:20

## 2024-10-15 RX ADMIN — Medication 600 MILLIGRAM(S): at 00:05

## 2024-10-15 RX ADMIN — Medication 600 MILLIGRAM(S): at 02:20

## 2024-10-15 RX ADMIN — Medication 30 MILLIGRAM(S): at 21:35

## 2024-10-15 RX ADMIN — Medication 600 MILLIGRAM(S): at 21:00

## 2024-10-15 RX ADMIN — Medication 975 MILLIGRAM(S): at 11:23

## 2024-10-15 RX ADMIN — Medication 975 MILLIGRAM(S): at 19:30

## 2024-10-15 RX ADMIN — Medication 600 MILLIGRAM(S): at 15:50

## 2024-10-15 RX ADMIN — Medication 325 MILLIGRAM(S): at 12:12

## 2024-10-15 RX ADMIN — Medication 600 MILLIGRAM(S): at 05:48

## 2024-10-15 RX ADMIN — Medication 500 MILLIGRAM(S): at 18:47

## 2024-10-15 RX ADMIN — OXYCODONE HYDROCHLORIDE 5 MILLIGRAM(S): 30 TABLET, FILM COATED, EXTENDED RELEASE ORAL at 18:46

## 2024-10-15 RX ADMIN — Medication 975 MILLIGRAM(S): at 18:46

## 2024-10-15 NOTE — PROGRESS NOTE ADULT - ASSESSMENT
42yo POD#3 s/p pLTCS for failed IOL c/b PPH (QBL 1790cc), course also c/b development of sPEC. Patient is stable and doing well post-operatively.     #sPEC  - s/p Mg for seizure prophylaxis, transitioned to Keppra q12hr on 10/12 due to concern for development of pulmonary edema  - CXR(10/12): mild pulmonary vascular congestion  - s/p Lasix x1  - s/p Pro10 (10/12), Lab10/20 (10/12)  - Procardia 60 XL daily  - HELLP wnl    #PPH (QBL 1790cc)  - s/p TXA, CytoPR, Hemabate x2 and modified B heredia sutures  - Received 2u PRBCs and 1u FFP intraoperatively   - Hct 33.5->25.2->26.4->25.2  - Fe/VitC    #PP state   - Continue regular diet.  - Increase ambulation.  - HSQ, venodynes for DVT prophylaxis  - Continue motrin, tylenol, oxycodone PRN for pain control      Rachel Nails, PGY3

## 2024-10-16 ENCOUNTER — TRANSCRIPTION ENCOUNTER (OUTPATIENT)
Age: 44
End: 2024-10-16

## 2024-10-16 LAB
ANION GAP SERPL CALC-SCNC: 12 MMOL/L — SIGNIFICANT CHANGE UP (ref 5–17)
BASOPHILS # BLD AUTO: 0.01 K/UL — SIGNIFICANT CHANGE UP (ref 0–0.2)
BASOPHILS NFR BLD AUTO: 0.2 % — SIGNIFICANT CHANGE UP (ref 0–2)
BUN SERPL-MCNC: 8 MG/DL — SIGNIFICANT CHANGE UP (ref 7–23)
CALCIUM SERPL-MCNC: 8.4 MG/DL — SIGNIFICANT CHANGE UP (ref 8.4–10.5)
CHLORIDE SERPL-SCNC: 104 MMOL/L — SIGNIFICANT CHANGE UP (ref 96–108)
CO2 SERPL-SCNC: 21 MMOL/L — LOW (ref 22–31)
CREAT SERPL-MCNC: 0.59 MG/DL — SIGNIFICANT CHANGE UP (ref 0.5–1.3)
EGFR: 116 ML/MIN/1.73M2 — SIGNIFICANT CHANGE UP
EOSINOPHIL # BLD AUTO: 0.27 K/UL — SIGNIFICANT CHANGE UP (ref 0–0.5)
EOSINOPHIL NFR BLD AUTO: 5.4 % — SIGNIFICANT CHANGE UP (ref 0–6)
GLUCOSE SERPL-MCNC: 92 MG/DL — SIGNIFICANT CHANGE UP (ref 70–99)
HCT VFR BLD CALC: 27 % — LOW (ref 34.5–45)
HGB BLD-MCNC: 9.2 G/DL — LOW (ref 11.5–15.5)
IMM GRANULOCYTES NFR BLD AUTO: 0.6 % — SIGNIFICANT CHANGE UP (ref 0–0.9)
LYMPHOCYTES # BLD AUTO: 1.43 K/UL — SIGNIFICANT CHANGE UP (ref 1–3.3)
LYMPHOCYTES # BLD AUTO: 28.4 % — SIGNIFICANT CHANGE UP (ref 13–44)
MCHC RBC-ENTMCNC: 30.3 PG — SIGNIFICANT CHANGE UP (ref 27–34)
MCHC RBC-ENTMCNC: 34.1 GM/DL — SIGNIFICANT CHANGE UP (ref 32–36)
MCV RBC AUTO: 88.8 FL — SIGNIFICANT CHANGE UP (ref 80–100)
MONOCYTES # BLD AUTO: 0.25 K/UL — SIGNIFICANT CHANGE UP (ref 0–0.9)
MONOCYTES NFR BLD AUTO: 5 % — SIGNIFICANT CHANGE UP (ref 2–14)
NEUTROPHILS # BLD AUTO: 3.04 K/UL — SIGNIFICANT CHANGE UP (ref 1.8–7.4)
NEUTROPHILS NFR BLD AUTO: 60.4 % — SIGNIFICANT CHANGE UP (ref 43–77)
NRBC # BLD: 0 /100 WBCS — SIGNIFICANT CHANGE UP (ref 0–0)
PLATELET # BLD AUTO: 229 K/UL — SIGNIFICANT CHANGE UP (ref 150–400)
POTASSIUM SERPL-MCNC: 4 MMOL/L — SIGNIFICANT CHANGE UP (ref 3.5–5.3)
POTASSIUM SERPL-SCNC: 4 MMOL/L — SIGNIFICANT CHANGE UP (ref 3.5–5.3)
RBC # BLD: 3.04 M/UL — LOW (ref 3.8–5.2)
RBC # FLD: 14.1 % — SIGNIFICANT CHANGE UP (ref 10.3–14.5)
SODIUM SERPL-SCNC: 137 MMOL/L — SIGNIFICANT CHANGE UP (ref 135–145)
WBC # BLD: 5.03 K/UL — SIGNIFICANT CHANGE UP (ref 3.8–10.5)
WBC # FLD AUTO: 5.03 K/UL — SIGNIFICANT CHANGE UP (ref 3.8–10.5)

## 2024-10-16 RX ADMIN — Medication 600 MILLIGRAM(S): at 16:00

## 2024-10-16 RX ADMIN — Medication 600 MILLIGRAM(S): at 15:17

## 2024-10-16 RX ADMIN — Medication 500 MILLIGRAM(S): at 10:06

## 2024-10-16 RX ADMIN — Medication 975 MILLIGRAM(S): at 06:35

## 2024-10-16 RX ADMIN — Medication 5000 UNIT(S): at 12:52

## 2024-10-16 RX ADMIN — Medication 600 MILLIGRAM(S): at 11:00

## 2024-10-16 RX ADMIN — Medication 975 MILLIGRAM(S): at 13:30

## 2024-10-16 RX ADMIN — Medication 500 MILLIGRAM(S): at 12:51

## 2024-10-16 RX ADMIN — Medication 975 MILLIGRAM(S): at 05:45

## 2024-10-16 RX ADMIN — Medication 90 MILLIGRAM(S): at 17:21

## 2024-10-16 RX ADMIN — Medication 975 MILLIGRAM(S): at 12:52

## 2024-10-16 RX ADMIN — Medication 325 MILLIGRAM(S): at 12:52

## 2024-10-16 RX ADMIN — Medication 975 MILLIGRAM(S): at 00:04

## 2024-10-16 RX ADMIN — OXYCODONE HYDROCHLORIDE 5 MILLIGRAM(S): 30 TABLET, FILM COATED, EXTENDED RELEASE ORAL at 01:00

## 2024-10-16 RX ADMIN — Medication 5000 UNIT(S): at 00:04

## 2024-10-16 RX ADMIN — Medication 600 MILLIGRAM(S): at 21:06

## 2024-10-16 RX ADMIN — Medication 975 MILLIGRAM(S): at 19:00

## 2024-10-16 RX ADMIN — OXYCODONE HYDROCHLORIDE 5 MILLIGRAM(S): 30 TABLET, FILM COATED, EXTENDED RELEASE ORAL at 05:44

## 2024-10-16 RX ADMIN — Medication 600 MILLIGRAM(S): at 10:06

## 2024-10-16 RX ADMIN — Medication 975 MILLIGRAM(S): at 01:00

## 2024-10-16 RX ADMIN — OXYCODONE HYDROCHLORIDE 5 MILLIGRAM(S): 30 TABLET, FILM COATED, EXTENDED RELEASE ORAL at 06:35

## 2024-10-16 RX ADMIN — Medication 975 MILLIGRAM(S): at 18:33

## 2024-10-16 RX ADMIN — Medication 600 MILLIGRAM(S): at 22:05

## 2024-10-16 RX ADMIN — OXYCODONE HYDROCHLORIDE 5 MILLIGRAM(S): 30 TABLET, FILM COATED, EXTENDED RELEASE ORAL at 00:04

## 2024-10-16 NOTE — CHART NOTE - NSCHARTNOTEFT_GEN_A_CORE
1716    155/97 BP was reported to me by the RN. After d/w on-call attending, Dr. LIZBETH Flores, plan to have Nifedpine 90mg XL qd be administered now with close monitoring of her BPs. Remainder of BPs today were 120-140s/80-90s  - If elevated, consider adding Labetalol or additional Nifedipine     Brandon Molina, PGY-3  Obstetrics & Gynecology

## 2024-10-16 NOTE — DISCHARGE NOTE OB - PATIENT PORTAL LINK FT
You can access the FollowMyHealth Patient Portal offered by [facility] by registering at the following website: [email or web address]. By joining [facility]’s FollowMyHealth portal, you will also be able to view your health information using other applications (apps) compatible with our system.

## 2024-10-16 NOTE — DISCHARGE NOTE OB - HOSPITAL COURSE
pt was an induction for pec --failed induction  then lfcs -atony noted at c/s -b yan placed. post op uncomplicated-placed on procardia 60xl  for her bp pt was an induction for pec --failed induction  then lfcs -atony noted at c/s -b yan placed. post op uncomplicated-placed on procardia 90xl  for her bp

## 2024-10-16 NOTE — DISCHARGE NOTE OB - PLAN OF CARE
reg diet  continue procrdia-hold if bp less than 12 reg diet  continue procrdia-hold if bp less than 120/60

## 2024-10-16 NOTE — DISCHARGE NOTE OB - CARE PLAN
1 Principal Discharge DX:	 delivery delivered  Assessment and plan of treatment:	reg diet  continue procrdia-hold if bp less than 12   Principal Discharge DX:	 delivery delivered  Assessment and plan of treatment:	reg diet  continue procrdia-hold if bp less than 120/60

## 2024-10-16 NOTE — DISCHARGE NOTE OB - MEDICATION SUMMARY - MEDICATIONS TO TAKE
I will START or STAY ON the medications listed below when I get home from the hospital:    ibuprofen 600 mg oral tablet  -- 1 tab(s) by mouth every 6 hours  -- Indication: For 37 weeks gestation of pregnancy

## 2024-10-16 NOTE — PROGRESS NOTE ADULT - ASSESSMENT
44yo POD#4 s/p pC/S for failed IOL c/b PPH (QBL 1790cc), course also c/b development of sPEC. Patient is stable and doing well post-operatively.     #sPEC  - s/p Mg for seizure prophylaxis, transitioned to Keppra q12hr on 10/12 due to concern for development of pulmonary edema  - CXR(10/12): mild pulmonary vascular congestion  - s/p Lasix x1  - s/p Pro10 (10/12), Lab10/20 (10/12)  - c/w Procardia 90 XL daily (patient was uptitrated from Procardia 60xL on 10/15)  - HELLP wnl    #PPH (QBL 1790cc)  - s/p TXA, CytoPR, Hemabate x2 and modified B heredia sutures  - Received 2u PRBCs and 1u FFP intraoperatively   - Hct 33.5->25.2->26.4->25.2  - Fe/VitC    #PP state   - Continue regular diet.  - Increase ambulation.  - HSQ, venodynes for DVT prophylaxis  - Continue motrin, tylenol, oxycodone PRN for pain control    E Gricel PGY4

## 2024-10-16 NOTE — DISCHARGE NOTE OB - CARE PROVIDER_API CALL
Gunjan Flores  Obstetrics and Gynecology  [address]  [place] [address]  Phone: [phone]  Fax: [phone]  Follow Up Time:

## 2024-10-16 NOTE — DISCHARGE NOTE OB - FINANCIAL ASSISTANCE
[facility] provides services at a reduced cost to those who are determined to be eligible through [facility]’s financial assistance program. Information regarding [facility]’s financial assistance program can be found by going to [email or web address] or by calling [phone].

## 2024-10-17 VITALS
SYSTOLIC BLOOD PRESSURE: 151 MMHG | RESPIRATION RATE: 18 BRPM | HEART RATE: 90 BPM | TEMPERATURE: 98 F | DIASTOLIC BLOOD PRESSURE: 96 MMHG | OXYGEN SATURATION: 99 %

## 2024-10-17 PROBLEM — Z87.42 PERSONAL HISTORY OF OTHER DISEASES OF THE FEMALE GENITAL TRACT: Chronic | Status: ACTIVE | Noted: 2024-10-10

## 2024-10-17 PROCEDURE — 71045 X-RAY EXAM CHEST 1 VIEW: CPT

## 2024-10-17 PROCEDURE — 80048 BASIC METABOLIC PNL TOTAL CA: CPT

## 2024-10-17 PROCEDURE — 85027 COMPLETE CBC AUTOMATED: CPT

## 2024-10-17 PROCEDURE — 84156 ASSAY OF PROTEIN URINE: CPT

## 2024-10-17 PROCEDURE — 80053 COMPREHEN METABOLIC PANEL: CPT

## 2024-10-17 PROCEDURE — 86900 BLOOD TYPING SEROLOGIC ABO: CPT

## 2024-10-17 PROCEDURE — 36415 COLL VENOUS BLD VENIPUNCTURE: CPT

## 2024-10-17 PROCEDURE — 85025 COMPLETE CBC W/AUTO DIFF WBC: CPT

## 2024-10-17 PROCEDURE — 85730 THROMBOPLASTIN TIME PARTIAL: CPT

## 2024-10-17 PROCEDURE — 85610 PROTHROMBIN TIME: CPT

## 2024-10-17 PROCEDURE — 59025 FETAL NON-STRESS TEST: CPT

## 2024-10-17 PROCEDURE — 86901 BLOOD TYPING SEROLOGIC RH(D): CPT

## 2024-10-17 PROCEDURE — 88307 TISSUE EXAM BY PATHOLOGIST: CPT

## 2024-10-17 PROCEDURE — C1889: CPT

## 2024-10-17 PROCEDURE — 85384 FIBRINOGEN ACTIVITY: CPT

## 2024-10-17 PROCEDURE — P9059: CPT

## 2024-10-17 PROCEDURE — 83615 LACTATE (LD) (LDH) ENZYME: CPT

## 2024-10-17 PROCEDURE — 86923 COMPATIBILITY TEST ELECTRIC: CPT

## 2024-10-17 PROCEDURE — 36430 TRANSFUSION BLD/BLD COMPNT: CPT

## 2024-10-17 PROCEDURE — 86780 TREPONEMA PALLIDUM: CPT

## 2024-10-17 PROCEDURE — 81001 URINALYSIS AUTO W/SCOPE: CPT

## 2024-10-17 PROCEDURE — 86850 RBC ANTIBODY SCREEN: CPT

## 2024-10-17 PROCEDURE — P9016: CPT

## 2024-10-17 PROCEDURE — 82570 ASSAY OF URINE CREATININE: CPT

## 2024-10-17 PROCEDURE — 59050 FETAL MONITOR W/REPORT: CPT

## 2024-10-17 PROCEDURE — 84550 ASSAY OF BLOOD/URIC ACID: CPT

## 2024-10-17 RX ADMIN — Medication 600 MILLIGRAM(S): at 15:30

## 2024-10-17 RX ADMIN — Medication 975 MILLIGRAM(S): at 18:25

## 2024-10-17 RX ADMIN — Medication 975 MILLIGRAM(S): at 00:17

## 2024-10-17 RX ADMIN — Medication 975 MILLIGRAM(S): at 05:25

## 2024-10-17 RX ADMIN — Medication 500 MILLIGRAM(S): at 17:40

## 2024-10-17 RX ADMIN — OXYCODONE HYDROCHLORIDE 5 MILLIGRAM(S): 30 TABLET, FILM COATED, EXTENDED RELEASE ORAL at 00:19

## 2024-10-17 RX ADMIN — Medication 975 MILLIGRAM(S): at 13:30

## 2024-10-17 RX ADMIN — Medication 5000 UNIT(S): at 00:16

## 2024-10-17 RX ADMIN — Medication 90 MILLIGRAM(S): at 18:17

## 2024-10-17 RX ADMIN — Medication 975 MILLIGRAM(S): at 01:17

## 2024-10-17 RX ADMIN — Medication 975 MILLIGRAM(S): at 17:39

## 2024-10-17 RX ADMIN — Medication 600 MILLIGRAM(S): at 09:32

## 2024-10-17 RX ADMIN — Medication 975 MILLIGRAM(S): at 06:19

## 2024-10-17 RX ADMIN — Medication 600 MILLIGRAM(S): at 10:00

## 2024-10-17 RX ADMIN — Medication 500 MILLIGRAM(S): at 09:32

## 2024-10-17 RX ADMIN — OXYCODONE HYDROCHLORIDE 5 MILLIGRAM(S): 30 TABLET, FILM COATED, EXTENDED RELEASE ORAL at 01:19

## 2024-10-17 NOTE — PROGRESS NOTE ADULT - ASSESSMENT
44yo POD#5 s/p pC/S for failed IOL c/b PPH (QBL 1790cc), course also c/b development of sPEC for which she is currently on Procardia 90xL. Patient is stable and doing well post-operatively.     #sPEC  - s/p Mg for seizure prophylaxis, transitioned to Keppra q12hr on 10/12 due to concern for development of pulmonary edema  - CXR(10/12): mild pulmonary vascular congestion  - s/p Lasix x1  - s/p Pro10 (10/12), Lab10/20 (10/12)  - c/w Procardia 90 XL daily (patient was uptitrated from Procardia 60xL on 10/15)  - HELLP wnl    #PPH (QBL 1790cc)  - s/p TXA, CytoPR, Hemabate x2 and modified B heredia sutures  - Received 2u PRBCs and 1u FFP intraoperatively   - Hct 33.5->25.2->26.4->25.2  - Fe/VitC    #PP state   - Continue regular diet.  - Increase ambulation.  - HSQ, venodynes for DVT prophylaxis  - Continue motrin, tylenol, oxycodone PRN for pain control    E Gricel PGY4

## 2024-10-17 NOTE — PROGRESS NOTE ADULT - ATTENDING COMMENTS
Pt doing well this AM. LE still very swollen. Voiding and ambulating. Cr last elevated to .9. Will trend in AM. cw BP monitoring for sPEC. Gml49HG, bp well controlled.    Rolf Gan MD
Pt doing well this AM. BP well controlled with [id number]. No sxs of pre-eclampsia with severe features at this time. Plan for dc w/ outpatient BP monitoring and followup    Rolf Gan MD
DOing well  VSS afeb  Abd S NT ND FF min lochia  Inc c/d/i  S/P C/S c/b PPH, now stable  Was on Keppra  BPs now well controlled with Procardia  Cont incr OOB, reg diet, monitor BPs.  DOUGLAS Joseph

## 2024-10-17 NOTE — CHART NOTE - NSCHARTNOTEFT_GEN_A_CORE
Notified by bedside RN of BP of 151/96. Patient still in house because they plan to feed their NICU  at ~8p. Patient is due for Nifedpine 90mg XL qd. After discussion with on-call attending, plan is for discharge as plan with close outpatient follow-up    Brandon Molina, PGY-3  Obstetrics & Gynecology     d/w Dr. ANA Gan

## 2024-10-17 NOTE — PROGRESS NOTE ADULT - SUBJECTIVE AND OBJECTIVE BOX
Day 1 of Anesthesia Pain Management Service    SUBJECTIVE:  Pain Scale Score:          [X] Refer to charted pain scores    THERAPY:    s/p 2 mg PF morphine epidurally    MEDICATIONS  (STANDING):  acetaminophen     Tablet .. 975 milliGRAM(s) Oral <User Schedule>  diphtheria/tetanus/pertussis (acellular) Vaccine (Adacel) 0.5 milliLiter(s) IntraMuscular once  heparin   Injectable 5000 Unit(s) SubCutaneous every 12 hours  HYDROmorphone PCA (1 mG/mL) 30 milliLiter(s) PCA Continuous PCA Continuous  ibuprofen  Tablet. 600 milliGRAM(s) Oral every 6 hours  ketorolac   Injectable 30 milliGRAM(s) IV Push every 6 hours  morphine PF Epidural 2 milliGRAM(s) Epidural once  NIFEdipine XL 60 milliGRAM(s) Oral every 24 hours  oxytocin Infusion 42 milliUNIT(s)/Min (42 mL/Hr) IV Continuous <Continuous>    MEDICATIONS  (PRN):  butorphanol Injectable 0.5 milliGRAM(s) IV Push every 6 hours PRN Pruritus  dexAMETHasone  Injectable 4 milliGRAM(s) IV Push every 6 hours PRN Nausea  diphenhydrAMINE 25 milliGRAM(s) Oral every 6 hours PRN Pruritus  hydrocortisone 1% Cream 1 Application(s) Topical two times a day PRN Rash and/or Itching  HYDROmorphone PCA (1 mG/mL) Rescue Clinician Bolus 0.5 milliGRAM(s) IV Push every 15 minutes PRN for Pain Scale GREATER THAN 6  lanolin Ointment 1 Application(s) Topical every 6 hours PRN Sore Nipples  magnesium hydroxide Suspension 30 milliLiter(s) Oral two times a day PRN Constipation  nalbuphine Injectable 2.5 milliGRAM(s) IV Push every 6 hours PRN Pruritus  naloxone Injectable 0.1 milliGRAM(s) IV Push every 3 minutes PRN For ANY of the following changes in patient status:  A. Breaths Per Minute LESS THAN 10, B. Oxygen saturation LESS THAN 90%, C. Sedation score of 6 for Stop After: 4 Times  ondansetron Injectable 4 milliGRAM(s) IV Push every 6 hours PRN Nausea  oxyCODONE    IR 5 milliGRAM(s) Oral once PRN Moderate to Severe Pain (4-10)  oxyCODONE    IR 5 milliGRAM(s) Oral every 3 hours PRN Moderate to Severe Pain (4-10)  simethicone 80 milliGRAM(s) Chew every 4 hours PRN Gas      OBJECTIVE:    Sedation:        	[X] Alert	[ ] Drowsy	[ ] Arousable      [ ] Asleep       [ ] Unresponsive    Side Effects:	[X] None	[ ] Nausea	[ ] Vomiting         [ ] Pruritus  		[ ] Weakness            [ ] Numbness	          [ ] Other:    Vital Signs Last 24 Hrs  T(C): 37.3 (13 Oct 2024 08:47), Max: 37.6 (13 Oct 2024 06:14)  T(F): 99.2 (13 Oct 2024 08:47), Max: 99.7 (13 Oct 2024 06:14)  HR: 85 (13 Oct 2024 08:47) (69 - 104)  BP: 145/84 (13 Oct 2024 08:47) (104/60 - 169/101)  BP(mean): 82 (12 Oct 2024 23:45) (82 - 129)  RR: 18 (13 Oct 2024 08:47) (10 - 33)  SpO2: 96% (13 Oct 2024 08:47) (81% - 98%)    Parameters below as of 13 Oct 2024 06:14  Patient On (Oxygen Delivery Method): room air        ASSESSMENT/ PLAN  [X] Patient transitioned to prn analgesics  [X] Pain management per primary service, pain service to sign off   [X]Documentation and Verification of current medications
OB Postpartum Note:  Delivery, POD#4    S: 42yo POD#4 s/p pC/S c/b sPEC as well as uterine atony (adherent placenta, bleeding sinuses, atony) requiring MTP. The patient feels well.  Pain is well controlled. She is tolerating a regular diet and passing flatus. She is voiding spontaneously, and ambulating without difficulty. Denies CP/SOB. Denies lightheadedness/dizziness. Denies N/V.    O:  Vitals:  Vital Signs Last 24 Hrs  T(C): 36.7 (16 Oct 2024 00:19), Max: 37.2 (15 Oct 2024 18:42)  T(F): 98.1 (16 Oct 2024 00:19), Max: 99 (15 Oct 2024 18:42)  HR: 85 (16 Oct 2024 00:19) (80 - 86)  BP: 127/75 (16 Oct 2024 00:19) (127/75 - 157/84)  BP(mean): --  RR: 18 (16 Oct 2024 00:19) (18 - 20)  SpO2: 97% (16 Oct 2024 00:19) (96% - 98%)    Parameters below as of 16 Oct 2024 00:19  Patient On (Oxygen Delivery Method): room air        MEDICATIONS  (STANDING):  acetaminophen     Tablet .. 975 milliGRAM(s) Oral <User Schedule>  ascorbic acid 500 milliGRAM(s) Oral three times a day  diphtheria/tetanus/pertussis (acellular) Vaccine (Adacel) 0.5 milliLiter(s) IntraMuscular once  ferrous    sulfate 325 milliGRAM(s) Oral daily  heparin   Injectable 5000 Unit(s) SubCutaneous every 12 hours  ibuprofen  Tablet. 600 milliGRAM(s) Oral every 6 hours  NIFEdipine XL 90 milliGRAM(s) Oral daily  oxytocin Infusion 42 milliUNIT(s)/Min (42 mL/Hr) IV Continuous <Continuous>    MEDICATIONS  (PRN):  diphenhydrAMINE 25 milliGRAM(s) Oral every 6 hours PRN Pruritus  hydrocortisone 1% Cream 1 Application(s) Topical two times a day PRN Rash and/or Itching  lanolin Ointment 1 Application(s) Topical every 6 hours PRN Sore Nipples  magnesium hydroxide Suspension 30 milliLiter(s) Oral two times a day PRN Constipation  oxyCODONE    IR 5 milliGRAM(s) Oral every 3 hours PRN Moderate to Severe Pain (4-10)  oxyCODONE    IR 5 milliGRAM(s) Oral once PRN Moderate to Severe Pain (4-10)  simethicone 80 milliGRAM(s) Chew every 4 hours PRN Gas      LABS:  Blood type: A Positive  Rubella IgG: RPR: Negative                          8.5[L]   7.11 >-----------< 176    ( 10-14 @ 06:22 )             25.2[L]                        9.0[L]   8.91 >-----------< 162    ( 10-13 @ 14:03 )             26.4[L]                        8.8[L]   10.52[H] >-----------< 162    ( 10-13 @ 06:17 )             25.2[L]    10-13-24 @ 06:17      133[L]  |  104  |  13  ----------------------------<  77  4.3   |  20[L]  |  0.92        Ca    7.9[L]      13 Oct 2024 06:17    TPro  4.5[L]  /  Alb  2.4[L]  /  TBili  0.3  /  DBili  x   /  AST  23  /  ALT  11  /  AlkPhos  115  10-13-24 @ 06:17          Physical exam:  Gen: NAD  Abdomen: Soft, nontender, no distension , firm uterine fundus at umbilicus.  Incision: Clean, dry, and intact   Pelvic: Normal lochia noted  Ext: No calf tenderness    
OB Postpartum Note:  Delivery, POD#5    S: 42yo POD#5 s/p pC/S c/b sPEC as well as uterine atony (adherent placenta, bleeding sinuses, atony) requiring MTP. The patient feels well.  Pain is well controlled. She is tolerating a regular diet and passing flatus. She is voiding spontaneously, and ambulating without difficulty. Denies CP/SOB. Denies lightheadedness/dizziness. Denies N/V.      O:  Vitals:  Vital Signs Last 24 Hrs  T(C): 36.6 (17 Oct 2024 01:29), Max: 37 (16 Oct 2024 05:21)  T(F): 97.9 (17 Oct 2024 01:), Max: 98.6 (16 Oct 2024 05:21)  HR: 86 (17 Oct 2024 01:) (72 - 86)  BP: 126/71 (17 Oct 2024 01:) (126/71 - 156/81)  BP(mean): --  RR: 18 (17 Oct 2024 01:) (18 - 18)  SpO2: 98% (17 Oct 2024 01:) (96% - 99%)    Parameters below as of 17 Oct 2024 01:29  Patient On (Oxygen Delivery Method): room air        MEDICATIONS  (STANDING):  acetaminophen     Tablet .. 975 milliGRAM(s) Oral <User Schedule>  ascorbic acid 500 milliGRAM(s) Oral three times a day  diphtheria/tetanus/pertussis (acellular) Vaccine (Adacel) 0.5 milliLiter(s) IntraMuscular once  ferrous    sulfate 325 milliGRAM(s) Oral daily  heparin   Injectable 5000 Unit(s) SubCutaneous every 12 hours  ibuprofen  Tablet. 600 milliGRAM(s) Oral every 6 hours  NIFEdipine XL 90 milliGRAM(s) Oral daily  oxytocin Infusion 42 milliUNIT(s)/Min (42 mL/Hr) IV Continuous <Continuous>    MEDICATIONS  (PRN):  diphenhydrAMINE 25 milliGRAM(s) Oral every 6 hours PRN Pruritus  hydrocortisone 1% Cream 1 Application(s) Topical two times a day PRN Rash and/or Itching  lanolin Ointment 1 Application(s) Topical every 6 hours PRN Sore Nipples  magnesium hydroxide Suspension 30 milliLiter(s) Oral two times a day PRN Constipation  oxyCODONE    IR 5 milliGRAM(s) Oral once PRN Moderate to Severe Pain (4-10)  oxyCODONE    IR 5 milliGRAM(s) Oral every 3 hours PRN Moderate to Severe Pain (4-10)  simethicone 80 milliGRAM(s) Chew every 4 hours PRN Gas      LABS:  Blood type: A Positive  Rubella IgG: RPR: Negative                          9.2[L]   5.03 >-----------< 229    ( 10-16 @ 07:11 )             27.0[L]                        8.5[L]   7.11 >-----------< 176    ( 10-14 @ 06:22 )             25.2[L]    10-16-24 @ 07:13      137  |  104  |  8   ----------------------------<  92  4.0   |  21[L]  |  0.59        Ca    8.4      16 Oct 2024 07:13            Physical exam:  Gen: NAD  Abdomen: Soft, nontender, no distension , firm uterine fundus at umbilicus.  Incision: Clean, dry, and intact   Pelvic: Normal lochia noted  Ext: No calf tenderness    
Postpartum Note,  Section    ATTENDING NOTE Post-operative day 3    Subjective:  The patient feels well.  She is ambulating.   She is tolerating regular diet.  She denies nausea and vomiting.  She is voiding.  Her pain is controlled.  She reports normal postpartum bleeding    Physical exam:    Vital Signs Last 24 Hrs  T(C): 36.6 (16 Oct 2024 10:15), Max: 37.2 (15 Oct 2024 18:42)  T(F): 97.8 (16 Oct 2024 10:15), Max: 99 (15 Oct 2024 18:42)  HR: 80 (16 Oct 2024 10:15) (80 - 85)  BP: 136/86 (16 Oct 2024 10:15) (126/82 - 157/84)  BP(mean): --  RR: 18 (16 Oct 2024 10:15) (18 - 18)  SpO2: 98% (16 Oct 2024 10:15) (96% - 98%)    Parameters below as of 16 Oct 2024 10:15  Patient On (Oxygen Delivery Method): room air        Gen: NAD  Breast: Soft, nontender, not engorged.  Abdomen: Soft, nontender, no distension , firm uterine fundus at umbilicus.  Incision: Clean, dry, and intact  Pelvic: Normal lochia noted  Ext: No calf tenderness    LABS:                        9.2    5.03  )-----------( 229      ( 16 Oct 2024 07:11 )             27.0       10-16-24 @ 07:13      137  |  104  |  8   ----------------------------<  92  4.0   |  21[L]  |  0.59        Ca    8.4      16 Oct 2024 07:13          Allergies    No Known Allergies    Intolerances      MEDICATIONS  (STANDING):  acetaminophen     Tablet .. 975 milliGRAM(s) Oral <User Schedule>  ascorbic acid 500 milliGRAM(s) Oral three times a day  diphtheria/tetanus/pertussis (acellular) Vaccine (Adacel) 0.5 milliLiter(s) IntraMuscular once  ferrous    sulfate 325 milliGRAM(s) Oral daily  heparin   Injectable 5000 Unit(s) SubCutaneous every 12 hours  ibuprofen  Tablet. 600 milliGRAM(s) Oral every 6 hours  NIFEdipine XL 90 milliGRAM(s) Oral daily  oxytocin Infusion 42 milliUNIT(s)/Min (42 mL/Hr) IV Continuous <Continuous>    MEDICATIONS  (PRN):  diphenhydrAMINE 25 milliGRAM(s) Oral every 6 hours PRN Pruritus  hydrocortisone 1% Cream 1 Application(s) Topical two times a day PRN Rash and/or Itching  lanolin Ointment 1 Application(s) Topical every 6 hours PRN Sore Nipples  magnesium hydroxide Suspension 30 milliLiter(s) Oral two times a day PRN Constipation  oxyCODONE    IR 5 milliGRAM(s) Oral every 3 hours PRN Moderate to Severe Pain (4-10)  oxyCODONE    IR 5 milliGRAM(s) Oral once PRN Moderate to Severe Pain (4-10)  simethicone 80 milliGRAM(s) Chew every 4 hours PRN Gas        Assessment and Plan  POD # 4  s/p  section. Stable.  Encourage ambulation  Analgesia prn  Regular diet   procardia increased to 90xl yesterday  will watch in house today to make sure bp is stable on new dose          
OB Postpartum Note:  Delivery, POD#3    S: 42yo POD#3 s/p pLTCS c/b sPEC and QBL 1790. The patient feels well.  Pain is well controlled. She is tolerating a regular diet and passing flatus. She is voiding spontaneously, and ambulating without difficulty. Denies CP/SOB. Denies lightheadedness/dizziness. Denies N/V. Denies sxs of PEC: no headaches, visual changes, RUQ pain, and respiratory distress    O:  Vitals:  Vital Signs Last 24 Hrs  T(C): 36.8 (15 Oct 2024 00:39), Max: 37.2 (14 Oct 2024 13:39)  T(F): 98.3 (15 Oct 2024 00:39), Max: 98.9 (14 Oct 2024 13:39)  HR: 82 (15 Oct 2024 00:39) (70 - 82)  BP: 128/77 (15 Oct 2024 00:39) (111/71 - 144/84)  BP(mean): --  RR: 18 (15 Oct 2024 00:39) (18 - 18)  SpO2: 95% (15 Oct 2024 00:39) (95% - 97%)    Parameters below as of 15 Oct 2024 00:39  Patient On (Oxygen Delivery Method): room air        MEDICATIONS  (STANDING):  acetaminophen     Tablet .. 975 milliGRAM(s) Oral <User Schedule>  diphtheria/tetanus/pertussis (acellular) Vaccine (Adacel) 0.5 milliLiter(s) IntraMuscular once  heparin   Injectable 5000 Unit(s) SubCutaneous every 12 hours  ibuprofen  Tablet. 600 milliGRAM(s) Oral every 6 hours  NIFEdipine XL 60 milliGRAM(s) Oral every 24 hours  oxytocin Infusion 42 milliUNIT(s)/Min (42 mL/Hr) IV Continuous <Continuous>    MEDICATIONS  (PRN):  diphenhydrAMINE 25 milliGRAM(s) Oral every 6 hours PRN Pruritus  hydrocortisone 1% Cream 1 Application(s) Topical two times a day PRN Rash and/or Itching  lanolin Ointment 1 Application(s) Topical every 6 hours PRN Sore Nipples  magnesium hydroxide Suspension 30 milliLiter(s) Oral two times a day PRN Constipation  oxyCODONE    IR 5 milliGRAM(s) Oral every 3 hours PRN Moderate to Severe Pain (4-10)  oxyCODONE    IR 5 milliGRAM(s) Oral once PRN Moderate to Severe Pain (4-10)  simethicone 80 milliGRAM(s) Chew every 4 hours PRN Gas      LABS:  Blood type: A Positive  Rubella IgG: RPR: Negative                          8.5[L]   7.11 >-----------< 176    ( 10-14 @ 06:22 )             25.2[L]                        9.0[L]   8.91 >-----------< 162    ( 10-13 @ 14:03 )             26.4[L]                        8.8[L]   10.52[H] >-----------< 162    ( 10-13 @ 06:17 )             25.2[L]                        11.5   14.66[H] >-----------< 190    ( 10-12 @ 13:09 )             33.5[L]                        10.2[L]   10.84[H] >-----------< 182    ( 10-12 @ 11:32 )             30.5[L]    10-13-24 @ 06:17      133[L]  |  104  |  13  ----------------------------<  77  4.3   |  20[L]  |  0.92    10-12-24 @ 13:09      142  |  109[H]  |  8   ----------------------------<  95  4.8   |  20[L]  |  0.79        Ca    7.9[L]      13 Oct 2024 06:17  Ca    8.4      12 Oct 2024 13:09    TPro  4.5[L]  /  Alb  2.4[L]  /  TBili  0.3  /  DBili  x   /  AST  23  /  ALT  11  /  AlkPhos  115  10-13-24 @ 06:17  TPro  6.1  /  Alb  3.0[L]  /  TBili  0.6  /  DBili  x   /  AST  25  /  ALT  16  /  AlkPhos  161[H]  10-12-24 @ 13:09          Physical exam:  Gen: NAD  Abdomen: Soft, nontender, no distension , firm uterine fundus at umbilicus.  Incision: Clean, dry, and intact with Prineo in place  Pelvic: Normal lochia noted  Ext: No calf tenderness, +2 pitting edema  
Postpartum Note,  Section   ATTENDING NOTE - Post-operative day 1    Subjective:  The patient feels well. Ambulating without difficulty  Pt is tolerating regular diet. Pain well controlled.  She denies nausea and vomiting.    Tu cleary'd   awaiting spont void       She reports normal postpartum bleeding    Physical exam:    Vital Signs Last 24 Hrs  T(C): 37.6 (13 Oct 2024 06:14), Max: 37.6 (13 Oct 2024 06:14)  T(F): 99.7 (13 Oct 2024 06:14), Max: 99.7 (13 Oct 2024 06:14)  HR: 86 (13 Oct 2024 06:14) (69 - 110)  BP: 104/60 (13 Oct 2024 06:14) (104/60 - 169/101)  BP(mean): 82 (12 Oct 2024 23:45) (82 - 129)  RR: 18 (13 Oct 2024 06:14) (10 - 33)  SpO2: 96% (13 Oct 2024 06:14) (81% - 100%)    Parameters below as of 13 Oct 2024 06:14  Patient On (Oxygen Delivery Method): room air        Gen: NAD  Breast: Soft, nontender, not engorged.  Abdomen: Soft, nontender, no distension , firm uterine fundus at umbilicus.  Incision: Clean, dry, and intact  Pelvic: Normal lochia noted  Ext: No calf tenderness, no hyper reflexia       LABS:                            8.8    10.52 )-----------( 162      ( 13 Oct 2024 06:17 )             25.2                         11.5   14.66 )-----------( 190      ( 12 Oct 2024 13:09 )             33.5                         10.2   10.84 )-----------( 182      ( 12 Oct 2024 11:32 )             30.5       10-13-24 @ 06:17      133[L]  |  104  |  13  ----------------------------<  77  4.3   |  20[L]  |  0.92    10-12-24 @ 13:09      142  |  109[H]  |  8   ----------------------------<  95  4.8   |  20[L]  |  0.79    10-10-24 @ 18:57      138  |  108  |  10  ----------------------------<  82  4.3   |  18[L]  |  0.62        Ca    7.9[L]      13 Oct 2024 06:17  Ca    8.4      12 Oct 2024 13:09  Ca    9.0      10 Oct 2024 18:57    TPro  4.5[L]  /  Alb  2.4[L]  /  TBili  0.3  /  DBili  x   /  AST  23  /  ALT  11  /  AlkPhos  115  10-13-24 @ 06:17  TPro  6.1  /  Alb  3.0[L]  /  TBili  0.6  /  DBili  x   /  AST  25  /  ALT  16  /  AlkPhos  161[H]  10-12-24 @ 13:09  TPro  6.0  /  Alb  3.3  /  TBili  0.2  /  DBili  x   /  AST  17  /  ALT  20  /  AlkPhos  167[H]  10-10-24 @ 18:57        Allergies    No Known Allergies    Intolerances      MEDICATIONS  (STANDING):  acetaminophen     Tablet .. 975 milliGRAM(s) Oral <User Schedule>  diphtheria/tetanus/pertussis (acellular) Vaccine (Adacel) 0.5 milliLiter(s) IntraMuscular once  heparin   Injectable 5000 Unit(s) SubCutaneous every 12 hours  HYDROmorphone PCA (1 mG/mL) 30 milliLiter(s) PCA Continuous PCA Continuous  ibuprofen  Tablet. 600 milliGRAM(s) Oral every 6 hours  ketorolac   Injectable 30 milliGRAM(s) IV Push every 6 hours  lactated ringers. 1000 milliLiter(s) (75 mL/Hr) IV Continuous <Continuous>  morphine PF Epidural 2 milliGRAM(s) Epidural once  NIFEdipine XL 60 milliGRAM(s) Oral every 24 hours  oxytocin Infusion 42 milliUNIT(s)/Min (42 mL/Hr) IV Continuous <Continuous>    MEDICATIONS  (PRN):  butorphanol Injectable 0.5 milliGRAM(s) IV Push every 6 hours PRN Pruritus  dexAMETHasone  Injectable 4 milliGRAM(s) IV Push every 6 hours PRN Nausea  diphenhydrAMINE 25 milliGRAM(s) Oral every 6 hours PRN Pruritus  hydrocortisone 1% Cream 1 Application(s) Topical two times a day PRN Rash and/or Itching  HYDROmorphone PCA (1 mG/mL) Rescue Clinician Bolus 0.5 milliGRAM(s) IV Push every 15 minutes PRN for Pain Scale GREATER THAN 6  lanolin Ointment 1 Application(s) Topical every 6 hours PRN Sore Nipples  magnesium hydroxide Suspension 30 milliLiter(s) Oral two times a day PRN Constipation  nalbuphine Injectable 2.5 milliGRAM(s) IV Push every 6 hours PRN Pruritus  naloxone Injectable 0.1 milliGRAM(s) IV Push every 3 minutes PRN For ANY of the following changes in patient status:  A. Breaths Per Minute LESS THAN 10, B. Oxygen saturation LESS THAN 90%, C. Sedation score of 6 for Stop After: 4 Times  ondansetron Injectable 4 milliGRAM(s) IV Push every 6 hours PRN Nausea  oxyCODONE    IR 5 milliGRAM(s) Oral once PRN Moderate to Severe Pain (4-10)  oxyCODONE    IR 5 milliGRAM(s) Oral every 3 hours PRN Moderate to Severe Pain (4-10)  simethicone 80 milliGRAM(s) Chew every 4 hours PRN Gas        Assessment and Plan  POD # 1  s/p  section with spec on keppra  . Stable.  Encourage ambulation  Continue with PCEA/PCA  Regular diet as tolerated  Follow up CBC  procardia 60 xl             
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 She received 2u PRBCs and 1u FFP intraoperatively. In RR patient with oxygen requirements, was placed on 5L NC, CXR performed showing clear lungs, physical exam significant for possible rales. Mg transitioned to Keppra for seizure ppx in setting of sPEC. Lasix IVP administered and patient weaned off O2, then transferred to the floor.     Her pain is well controlled. Tolerating sips of water. Has ambulated at bedside. Mae catheter in place.    O:   Vital Signs Last 24 Hrs  T(C): 37.1 (13 Oct 2024 00:35), Max: 37.3 (12 Oct 2024 15:00)  T(F): 98.7 (13 Oct 2024 00:35), Max: 99.1 (12 Oct 2024 15:00)  HR: 79 (13 Oct 2024 00:29) (69 - 118)  BP: 143/92 (13 Oct 2024 00:29) (78/50 - 169/101)  BP(mean): 82 (12 Oct 2024 23:45) (82 - 129)  RR: 18 (13 Oct 2024 00:35) (10 - 33)  SpO2: 96% (13 Oct 2024 00:35) (81% - 100%)    Parameters below as of 13 Oct 2024 00:35  Patient On (Oxygen Delivery Method): room air    Labs:  Blood type: A Positive  Rubella IgG: RPR: Negative                          11.5   14.66[H] >-----------< 190    ( 10-12 @ 13:09 )             33.5[L]                        10.2[L]   10.84[H] >-----------< 182    ( 10-12 @ 11:32 )             30.5[L]                        10.4[L]   7.71 >-----------< 221    ( 10-10 @ 19:08 )             30.5[L]    10-24 @ 13:09      142  |  109[H]  |  8   ----------------------------<  95  4.8   |  20[L]  |  0.79    10-10-24 @ 18:57      138  |  108  |  10  ----------------------------<  82  4.3   |  18[L]  |  0.62        Ca    8.4      12 Oct 2024 13:09  Ca    9.0      10 Oct 2024 18:57    TPro  6.1  /  Alb  3.0[L]  /  TBili  0.6  /  DBili  x   /  AST  25  /  ALT  16  /  AlkPhos  161[H]  10-12-24 @ 13:09  TPro  6.0  /  Alb  3.3  /  TBili  0.2  /  DBili  x   /  AST  17  /  ALT  20  /  AlkPhos  167[H]  10-10-24 @ 18:57    PE:  General: NAD  Abdomen: Mildly distended, appropriately tender, incision c/d/i.  Extremities: No erythema, no pitting edema
OB Progress Note: pLTCS, POD#2    S: 44yo POD#2 s/p pLTCS c/b sPEC and EBL 1790. Pain is well controlled. She is tolerating a regular diet and passing flatus. She is voiding spontaneously, and ambulating without difficulty. Denies CP/SOB. Denies lightheadedness/dizziness. Denies N/V. Denies sxs od PEC: denies headache, visual changes, RUQ pain, respiratory distress    O:  Vitals:  Vital Signs Last 24 Hrs  T(C): 36.9 (14 Oct 2024 05:49), Max: 37.3 (13 Oct 2024 08:47)  T(F): 98.4 (14 Oct 2024 05:49), Max: 99.2 (13 Oct 2024 08:47)  HR: 70 (14 Oct 2024 05:49) (70 - 85)  BP: 111/71 (14 Oct 2024 05:49) (100/66 - 145/84)  BP(mean): --  RR: 18 (14 Oct 2024 05:49) (18 - 18)  SpO2: 95% (14 Oct 2024 05:49) (95% - 97%)    Parameters below as of 14 Oct 2024 05:49  Patient On (Oxygen Delivery Method): room air        MEDICATIONS  (STANDING):  acetaminophen     Tablet .. 975 milliGRAM(s) Oral <User Schedule>  diphtheria/tetanus/pertussis (acellular) Vaccine (Adacel) 0.5 milliLiter(s) IntraMuscular once  heparin   Injectable 5000 Unit(s) SubCutaneous every 12 hours  ibuprofen  Tablet. 600 milliGRAM(s) Oral every 6 hours  NIFEdipine XL 60 milliGRAM(s) Oral every 24 hours  oxytocin Infusion 42 milliUNIT(s)/Min (42 mL/Hr) IV Continuous <Continuous>      MEDICATIONS  (PRN):  diphenhydrAMINE 25 milliGRAM(s) Oral every 6 hours PRN Pruritus  hydrocortisone 1% Cream 1 Application(s) Topical two times a day PRN Rash and/or Itching  lanolin Ointment 1 Application(s) Topical every 6 hours PRN Sore Nipples  magnesium hydroxide Suspension 30 milliLiter(s) Oral two times a day PRN Constipation  oxyCODONE    IR 5 milliGRAM(s) Oral every 3 hours PRN Moderate to Severe Pain (4-10)  oxyCODONE    IR 5 milliGRAM(s) Oral once PRN Moderate to Severe Pain (4-10)  simethicone 80 milliGRAM(s) Chew every 4 hours PRN Gas      Labs:  Blood type: A Positive  Rubella IgG: RPR: Negative                          9.0[L]   8.91 >-----------< 162    ( 10-13 @ 14:03 )             26.4[L]                        8.8[L]   10.52[H] >-----------< 162    ( 10-13 @ 06:17 )             25.2[L]                        11.5   14.66[H] >-----------< 190    ( 10-12 @ 13:09 )             33.5[L]                        10.2[L]   10.84[H] >-----------< 182    ( 10-12 @ 11:32 )             30.5[L]    10-13-24 @ 06:17      133[L]  |  104  |  13  ----------------------------<  77  4.3   |  20[L]  |  0.92    10-12-24 @ 13:09      142  |  109[H]  |  8   ----------------------------<  95  4.8   |  20[L]  |  0.79        Ca    7.9[L]      13 Oct 2024 06:17  Ca    8.4      12 Oct 2024 13:09    TPro  4.5[L]  /  Alb  2.4[L]  /  TBili  0.3  /  DBili  x   /  AST  23  /  ALT  11  /  AlkPhos  115  10-13-24 @ 06:17  TPro  6.1  /  Alb  3.0[L]  /  TBili  0.6  /  DBili  x   /  AST  25  /  ALT  16  /  AlkPhos  161[H]  10-12-24 @ 13:09          PE:  General: NAD  Abdomen: Soft, appropriately tender, incision c/d/i with Prineo in place.  Extremities: No erythema, no pitting edema

## 2024-11-04 ENCOUNTER — APPOINTMENT (OUTPATIENT)
Dept: CARDIOLOGY | Facility: CLINIC | Age: 44
End: 2024-11-04
Payer: COMMERCIAL

## 2024-11-04 VITALS — BODY MASS INDEX: 29.95 KG/M2 | WEIGHT: 169 LBS | HEIGHT: 63 IN

## 2024-11-04 DIAGNOSIS — Z83.3 FAMILY HISTORY OF DIABETES MELLITUS: ICD-10-CM

## 2024-11-04 DIAGNOSIS — Z82.49 FAMILY HISTORY OF ISCHEMIC HEART DISEASE AND OTHER DISEASES OF THE CIRCULATORY SYSTEM: ICD-10-CM

## 2024-11-04 DIAGNOSIS — O14.10 SEVERE PRE-ECLAMPSIA, UNSPECIFIED TRIMESTER: ICD-10-CM

## 2024-11-04 DIAGNOSIS — Z82.3 FAMILY HISTORY OF STROKE: ICD-10-CM

## 2024-11-04 DIAGNOSIS — Z87.59 PERSONAL HISTORY OF OTHER COMPLICATIONS OF PREGNANCY, CHILDBIRTH AND THE PUERPERIUM: ICD-10-CM

## 2024-11-04 PROCEDURE — 99205 OFFICE O/P NEW HI 60 MIN: CPT

## 2024-11-04 RX ORDER — NIFEDIPINE 30 MG/1
30 TABLET, EXTENDED RELEASE ORAL
Qty: 180 | Refills: 0 | Status: ACTIVE | COMMUNITY
Start: 2024-11-04 | End: 1900-01-01

## 2024-11-04 RX ORDER — PRENATAL VIT 49/IRON FUM/FOLIC 6.75-0.2MG
TABLET ORAL
Refills: 0 | Status: ACTIVE | COMMUNITY

## 2024-11-06 LAB — SURGICAL PATHOLOGY STUDY: SIGNIFICANT CHANGE UP

## 2024-12-02 ENCOUNTER — APPOINTMENT (OUTPATIENT)
Dept: CARDIOLOGY | Facility: CLINIC | Age: 44
End: 2024-12-02
Payer: COMMERCIAL

## 2024-12-02 DIAGNOSIS — O14.10 SEVERE PRE-ECLAMPSIA, UNSPECIFIED TRIMESTER: ICD-10-CM

## 2024-12-02 PROCEDURE — 99214 OFFICE O/P EST MOD 30 MIN: CPT

## 2024-12-02 RX ORDER — NIFEDIPINE 30 MG/1
30 TABLET, EXTENDED RELEASE ORAL
Qty: 60 | Refills: 1 | Status: ACTIVE | COMMUNITY
Start: 2024-12-02 | End: 1900-01-01

## 2025-01-06 ENCOUNTER — APPOINTMENT (OUTPATIENT)
Dept: CARDIOLOGY | Facility: CLINIC | Age: 45
End: 2025-01-06
Payer: COMMERCIAL

## 2025-01-06 DIAGNOSIS — Z00.00 ENCOUNTER FOR GENERAL ADULT MEDICAL EXAMINATION W/OUT ABNORMAL FINDINGS: ICD-10-CM

## 2025-01-06 DIAGNOSIS — O14.10 SEVERE PRE-ECLAMPSIA, UNSPECIFIED TRIMESTER: ICD-10-CM

## 2025-01-06 DIAGNOSIS — R06.09 OTHER FORMS OF DYSPNEA: ICD-10-CM

## 2025-01-06 PROCEDURE — 99214 OFFICE O/P EST MOD 30 MIN: CPT

## 2025-01-06 PROCEDURE — G2211 COMPLEX E/M VISIT ADD ON: CPT | Mod: NC

## 2025-04-04 ENCOUNTER — OUTPATIENT (OUTPATIENT)
Dept: OUTPATIENT SERVICES | Facility: HOSPITAL | Age: 45
LOS: 1 days | End: 2025-04-04
Payer: COMMERCIAL

## 2025-04-04 ENCOUNTER — RESULT REVIEW (OUTPATIENT)
Age: 45
End: 2025-04-04

## 2025-04-04 ENCOUNTER — APPOINTMENT (OUTPATIENT)
Dept: CV DIAGNOSITCS | Facility: HOSPITAL | Age: 45
End: 2025-04-04

## 2025-04-04 ENCOUNTER — APPOINTMENT (OUTPATIENT)
Dept: CARDIOLOGY | Facility: CLINIC | Age: 45
End: 2025-04-04
Payer: COMMERCIAL

## 2025-04-04 ENCOUNTER — NON-APPOINTMENT (OUTPATIENT)
Age: 45
End: 2025-04-04

## 2025-04-04 ENCOUNTER — APPOINTMENT (OUTPATIENT)
Dept: CV DIAGNOSTICS | Facility: HOSPITAL | Age: 45
End: 2025-04-04

## 2025-04-04 VITALS
HEIGHT: 63 IN | SYSTOLIC BLOOD PRESSURE: 114 MMHG | OXYGEN SATURATION: 96 % | HEART RATE: 82 BPM | DIASTOLIC BLOOD PRESSURE: 75 MMHG | WEIGHT: 160 LBS | BODY MASS INDEX: 28.35 KG/M2

## 2025-04-04 DIAGNOSIS — Z87.42 PERSONAL HISTORY OF OTHER DISEASES OF THE FEMALE GENITAL TRACT: Chronic | ICD-10-CM

## 2025-04-04 DIAGNOSIS — Z98.890 OTHER SPECIFIED POSTPROCEDURAL STATES: Chronic | ICD-10-CM

## 2025-04-04 DIAGNOSIS — R06.09 OTHER FORMS OF DYSPNEA: ICD-10-CM

## 2025-04-04 DIAGNOSIS — K08.409 PARTIAL LOSS OF TEETH, UNSPECIFIED CAUSE, UNSPECIFIED CLASS: Chronic | ICD-10-CM

## 2025-04-04 PROCEDURE — 93017 CV STRESS TEST TRACING ONLY: CPT

## 2025-04-04 PROCEDURE — 93000 ELECTROCARDIOGRAM COMPLETE: CPT

## 2025-04-04 PROCEDURE — 99204 OFFICE O/P NEW MOD 45 MIN: CPT

## 2025-04-04 PROCEDURE — G2211 COMPLEX E/M VISIT ADD ON: CPT | Mod: NC

## 2025-04-04 PROCEDURE — 93016 CV STRESS TEST SUPVJ ONLY: CPT

## 2025-04-04 PROCEDURE — 76376 3D RENDER W/INTRP POSTPROCES: CPT

## 2025-04-04 PROCEDURE — 93018 CV STRESS TEST I&R ONLY: CPT

## 2025-04-04 PROCEDURE — 93306 TTE W/DOPPLER COMPLETE: CPT

## 2025-04-04 PROCEDURE — 76376 3D RENDER W/INTRP POSTPROCES: CPT | Mod: 26

## 2025-04-04 PROCEDURE — 93306 TTE W/DOPPLER COMPLETE: CPT | Mod: 26

## 2025-04-15 NOTE — PRE-ANESTHESIA EVALUATION ADULT - NSANTHOBSERVEDRD_ENT_A_CORE
No 36 year old female G0 PMH HTN, Mildly  Elevated Cholesterol ( no current medications), Prediabetes, ADHD ( no current medications), Rosacea,  Hypothyroidism, Thyroid Nodule, COVID-19; now with Polyp Of Corpus uteri, Encounter Insertion Intrauterine Contraceptive Device; presents today for PST prior to Dilation & Curettage Hysteroscopy - Polypectomy - Insertion Mirena IUD with Dr madiha Munson on 4/25/2025.     Pt notes heavy bleeding and cramping with menses which has been going on for years. Pt notes she underwent both transvaginal sonogram as well as a saline sonogram which noted Polyps. She also notes having undergone ial Biopsy which was negative. Following discussions with Dr Munson regarding treatment options pt is electing for scheduled procedure.